# Patient Record
Sex: MALE | Race: OTHER | NOT HISPANIC OR LATINO | Employment: FULL TIME | URBAN - METROPOLITAN AREA
[De-identification: names, ages, dates, MRNs, and addresses within clinical notes are randomized per-mention and may not be internally consistent; named-entity substitution may affect disease eponyms.]

---

## 2019-08-21 ENCOUNTER — OFFICE VISIT (OUTPATIENT)
Dept: FAMILY MEDICINE CLINIC | Facility: CLINIC | Age: 24
End: 2019-08-21
Payer: COMMERCIAL

## 2019-08-21 VITALS
HEIGHT: 67 IN | DIASTOLIC BLOOD PRESSURE: 70 MMHG | RESPIRATION RATE: 16 BRPM | WEIGHT: 162 LBS | TEMPERATURE: 98.2 F | BODY MASS INDEX: 25.43 KG/M2 | OXYGEN SATURATION: 98 % | HEART RATE: 99 BPM | SYSTOLIC BLOOD PRESSURE: 130 MMHG

## 2019-08-21 DIAGNOSIS — F41.9 ANXIETY: Primary | ICD-10-CM

## 2019-08-21 PROCEDURE — 99203 OFFICE O/P NEW LOW 30 MIN: CPT | Performed by: FAMILY MEDICINE

## 2019-08-21 RX ORDER — ALBUTEROL SULFATE 90 UG/1
2 AEROSOL, METERED RESPIRATORY (INHALATION) EVERY 6 HOURS PRN
COMMUNITY

## 2019-08-21 RX ORDER — FLUOXETINE 10 MG/1
10 CAPSULE ORAL DAILY
Qty: 30 CAPSULE | Refills: 5 | Status: SHIPPED | OUTPATIENT
Start: 2019-08-21

## 2019-08-21 NOTE — PROGRESS NOTES
Assessment/Plan:  1  Anxiety  Excessive anxiety and worry regarding several issues are present most of the time for more than 6 months  Difficulty controlling worry  For the past 6 months he  Restlessness or feeling on edge  Easily fatigued  Difficulty concentrating  Irritability  Muscle tension  Sleep disturbance (difficulty falling or staying asleep, or restless sleep)  Anxiety causes significant distress or impairment in social, occupational, or other important areas of functioning  There is not another mental disorder (e g , worries about having a panic attack in panic disorder), worries about embarrassment in public (social phobia), fear of contamination (obsessive-compulsive disorder), separation anxiety; fear of gaining weight (anorexia nervosa), multiple physical complaints (somatization disorder), fear of a serious illness (hypochondriasis), or exclusively due to PTSD  The present episode of anxiety  is not attributable to the physiological effects of a substance or another medical condition  Marijuana [place] approved by [place] dept of health  Patient wants to try Medical Marijuana   - FLUoxetine (PROzac) 10 mg capsule; Take 1 capsule (10 mg total) by mouth daily  Dispense: 30 capsule; Refill: 5  - Comprehensive metabolic panel; Future  - CBC and differential; Future  - TSH, 3rd generation with Free T4 reflex; Future    2  BMI 25 0-25 9,adult  Condition is stable with current treatment, to  continue the same      No problem-specific Assessment & Plan notes found for this encounter  Diagnoses and all orders for this visit:    Anxiety  -     FLUoxetine (PROzac) 10 mg capsule; Take 1 capsule (10 mg total) by mouth daily    Other orders  -     albuterol (PROVENTIL HFA,VENTOLIN HFA) 90 mcg/act inhaler; Inhale 2 puffs every 6 (six) hours as needed for wheezing          Subjective:      Patient ID: Remi Bamberger is a 30 y o  male      HPI  History of 6 months of :  Difficulty controlling worry   Restlessness or feeling on edge  Easily fatigued  Difficulty concentrating  Irritability  Muscle tension  Sleep disturbance (difficulty falling or staying asleep, or restless sleep)  The following portions of the patient's history were reviewed and updated as appropriate: allergies, current medications, past family history, past medical history, past social history, past surgical history and problem list     Review of Systems   Constitutional: Positive for fatigue  Negative for chills, diaphoresis and fever  HENT: Negative for hearing loss, sinus pressure, sore throat and trouble swallowing  Eyes: Negative for photophobia, pain, redness and visual disturbance  Respiratory: Negative for cough, choking, chest tightness and shortness of breath  Cardiovascular: Negative for chest pain, palpitations and leg swelling  Gastrointestinal: Negative for abdominal pain  Genitourinary: Negative for difficulty urinating, dysuria, enuresis and flank pain  Musculoskeletal: Negative for arthralgias, back pain, gait problem and joint swelling  Neurological: Negative for dizziness, facial asymmetry, light-headedness and headaches  Psychiatric/Behavioral: Negative for agitation, behavioral problems, confusion and decreased concentration  Objective:      /70 (BP Location: Left arm, Patient Position: Sitting, Cuff Size: Standard)   Pulse 99   Temp 98 2 °F (36 8 °C) (Oral)   Resp 16   Ht 5' 7" (1 702 m)   Wt 73 5 kg (162 lb)   SpO2 98%   BMI 25 37 kg/m²          Physical Exam   Constitutional: No distress  HENT:   Nose: Nose normal    Mouth/Throat: Oropharynx is clear and moist    Eyes: Pupils are equal, round, and reactive to light  Conjunctivae are normal    Neck: Normal range of motion  No thyromegaly present  Cardiovascular: Normal rate, regular rhythm and normal heart sounds  Exam reveals no friction rub  No murmur heard    Pulmonary/Chest: Effort normal and breath sounds normal  No stridor  No respiratory distress  Musculoskeletal: He exhibits no edema, tenderness or deformity  Neurological: He displays normal reflexes  No cranial nerve deficit  He exhibits normal muscle tone  Coordination normal    Skin: He is not diaphoretic  BMI Counseling: Body mass index is 25 37 kg/m²  Discussed the patient's BMI with him  The BMI is above average  BMI counseling and education was provided to the patient  Exercise recommendations include exercising 3-5 times per week

## 2019-08-22 NOTE — PATIENT INSTRUCTIONS

## 2021-05-11 ENCOUNTER — TELEPHONE (OUTPATIENT)
Dept: FAMILY MEDICINE CLINIC | Facility: CLINIC | Age: 26
End: 2021-05-11

## 2024-02-07 ENCOUNTER — OFFICE VISIT (OUTPATIENT)
Dept: OBGYN CLINIC | Facility: OTHER | Age: 29
End: 2024-02-07
Payer: COMMERCIAL

## 2024-02-07 ENCOUNTER — APPOINTMENT (OUTPATIENT)
Dept: RADIOLOGY | Facility: OTHER | Age: 29
End: 2024-02-07
Payer: COMMERCIAL

## 2024-02-07 VITALS
HEART RATE: 74 BPM | HEIGHT: 67 IN | BODY MASS INDEX: 26.65 KG/M2 | DIASTOLIC BLOOD PRESSURE: 87 MMHG | SYSTOLIC BLOOD PRESSURE: 137 MMHG | WEIGHT: 169.8 LBS

## 2024-02-07 DIAGNOSIS — M25.562 LEFT KNEE PAIN, UNSPECIFIED CHRONICITY: ICD-10-CM

## 2024-02-07 DIAGNOSIS — M25.561 RIGHT KNEE PAIN, UNSPECIFIED CHRONICITY: ICD-10-CM

## 2024-02-07 DIAGNOSIS — M25.561 RIGHT KNEE PAIN, UNSPECIFIED CHRONICITY: Primary | ICD-10-CM

## 2024-02-07 PROCEDURE — 99203 OFFICE O/P NEW LOW 30 MIN: CPT | Performed by: ORTHOPAEDIC SURGERY

## 2024-02-07 PROCEDURE — 73564 X-RAY EXAM KNEE 4 OR MORE: CPT

## 2024-02-07 NOTE — PROGRESS NOTES
[facility]  Johnathan Uribe (29 y.o. male)   : 1995   MRN: [id number]  Encounter Date: 2024    Chief Complaint   Patient presents with    Right Knee - Pain       Assessment / Plan  Right Knee - pre-patellar bursitis      Discussed using knee pads or a kneeling pad to help reduce friction on patella  Take Ibuprofen 3 tables 3x a day or Naproxen 2 tablets 3x a day for 2-3 weeks  Modify job activities to reduce friction on patella  XR reviewed at today's visit  No follow-ups on file.    History of Present Illness  Johnathan Uribe is a 29 y.o. male who presents presents for right knee pain. He reports having the pain for a few weeks. He does not remember any inciting injury. He notes the pain is mostly over the anterior aspect of his knee. He states he can do daily activity without any issues, but gets the pain whenever he bumps the knee against anything. 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 He has not had any formal treatment for his knee. He denies any numbness and tingling.    Review of Systems  Pertinent items are noted in HPI.  All other systems were reviewed and are negative.    Physical Exam  /87   Pulse 74   Ht 5' 7\" (1.702 m)   Wt 77 kg (169 lb 12.8 oz)   BMI 26.59 kg/m²   Cons: Appears well.  No apparent distress.  Psych: Alert. Oriented x3.  Mood and affect normal.  Eyes: PERRLA, EOMI  Resp: Normal effort.  No audible wheezing or stridor.  CV: Palpable pulse.  No discernable arrhythmia.  No LE edema.  Lymph:  No palpable cervical, axillary, or inguinal lymphadenopathy.  Skin: Warm.  No palpable masses.  No visible lesions.  Neuro: Normal muscle tone.  Normal and symmetric DTR's.     Right Knee Exam  Alignment:  Normal knee alignment.  Inspection:  No swelling. No edema. No ecchymosis. No muscle atrophy.  Palpation:   superior border of the patella tenderness.  ROM:  Knee Extension 0. Knee Flexion 135.  Strength:  5/5 " quadriceps and hamstrings.  Stability:  No objective knee instability. Stable Varus / Valgus stress, Lachman, and Posterior drawer.  Tests:  No pertinent positive or negative tests.  Patella:  Patella tracks centrally without crepitus.  Neurovascular:  Sensation intact in DP/SP/Cote/Sa/T nerve distributions.  2+ DP & PT pulses.  Gait:  Normal.     Studies Reviewed  I have personally reviewed pertinent films in PACS.  XR of right knee - no osseous abnormalities    Procedures  No procedures today.    Medical, Surgical, Family, and Social History  The patient's medical history, family history, and social history, were reviewed and updated as appropriate.    Past Medical History:   Diagnosis Date    Anxiety     Asthma        Past Surgical History:   Procedure Laterality Date    CIRCUMCISION         Family History   Problem Relation Age of Onset    Hypertension Mother     Diabetes Father        Social History     Occupational History    Not on file   Tobacco Use    Smoking status: Never    Smokeless tobacco: Never   Substance and Sexual Activity    Alcohol use: Yes    Drug use: Yes     Types: Marijuana    Sexual activity: Yes     Partners: Female       No Known Allergies      Current Outpatient Medications:     albuterol (PROVENTIL HFA,VENTOLIN HFA) 90 mcg/act inhaler, Inhale 2 puffs every 6 (six) hours as needed for wheezing, Disp: , Rfl:     FLUoxetine (PROzac) 10 mg capsule, Take 1 capsule (10 mg total) by mouth daily (Patient not taking: Reported on 2/7/2024), Disp: 30 capsule, Rfl: 5      Reg Randall    Scribe Attestation      I,:  Reg Randall am acting as a scribe while in the presence of the attending physician.:       I,:  Daniel Pleitez MD personally performed the services described in this documentation    as scribed in my presence.:

## 2024-02-21 ENCOUNTER — TELEPHONE (OUTPATIENT)
Dept: FAMILY MEDICINE CLINIC | Facility: CLINIC | Age: 29
End: 2024-02-21

## 2024-02-21 ENCOUNTER — TELEPHONE (OUTPATIENT)
Age: 29
End: 2024-02-21

## 2024-02-21 NOTE — TELEPHONE ENCOUNTER
1st Attempt  Patient called left message for patient to call the office to schedule a PE appt. Letter sent.

## 2024-02-21 NOTE — TELEPHONE ENCOUNTER
Caller: Tia -spouse     Doctor: Angela     Reason for call: Spouse stating patient has possible hand fx on the right hand she is also stating she was told by Dr. Miller patient can be put onto his schedule for this Friday no appointments are available until 3/8 are we able to set an appointment for the patient ?   Please advise      Call back#: [phone]

## 2024-02-23 VITALS
DIASTOLIC BLOOD PRESSURE: 63 MMHG | SYSTOLIC BLOOD PRESSURE: 100 MMHG | BODY MASS INDEX: 26.53 KG/M2 | WEIGHT: 169 LBS | HEIGHT: 67 IN

## 2024-02-23 DIAGNOSIS — S60.00XA CONTUSION OF FINGER OF RIGHT HAND, INITIAL ENCOUNTER: Primary | ICD-10-CM

## 2024-02-23 PROCEDURE — 99213 OFFICE O/P EST LOW 20 MIN: CPT | Performed by: ORTHOPAEDIC SURGERY

## 2024-02-23 NOTE — PROGRESS NOTES
The HAND & UPPER EXTREMITY OFFICE VISIT   Referred By:  Jhony Valentine Md  [address] [place] [address]  [address] [place] [id number]      Chief Complaint:     Right hand pain    History of Present Illness:   29 y.o., Right hand dominant male presents with right small finger MCP pain    Patient states that he works [employer] and he was trying to loosen a pipe about 3 weeks ago when the pipe suddenly loosened and he hit his hand. He states he had immediate pain in the MCP of his small finger. He states since the injury, his pain has improved slightly but he continue to have pain with making a fist as well as the joint being tender. He denies any numbness or tingling into his finger.   He has not used pain medications      ADLs: Community ambulator    Smoke: no   ETOH: no   Drugs:  Zillow  Job: [employer]       Past Medical History:  Past Medical History:   Diagnosis Date    Anxiety     Asthma      Past Surgical History:   Procedure Laterality Date    CIRCUMCISION       Family History   Problem Relation Age of Onset    Hypertension Mother     Diabetes Father      Social History     Socioeconomic History    Marital status: Single     Spouse name: Not on file    Number of children: Not on file    Years of education: Not on file    Highest education level: Not on file   Occupational History    Not on file   Tobacco Use    Smoking status: Never    Smokeless tobacco: Never   Substance and Sexual Activity    Alcohol use: Yes    Drug use: Yes     Types: Marijuana    Sexual activity: Yes     Partners: Female   Other Topics Concern    Not on file   Social History Narrative    Not on file     Social Determinants of Health     Financial Resource Strain: Not on file   Food Insecurity: Not on file   Transportation Needs: Not on file   Physical Activity: Not on file   Stress: Not on file   Social Connections: Not on file   Intimate Partner Violence: Not on file   Housing Stability: Not on file     Scheduled Meds:  Continuous Infusions:No current  "facility-administered medications for this visit.    PRN Meds:.  Allergies   Allergen Reactions    Cats Claw (Uncaria Tomentosa) Cough       Physical Examination:    /63   Ht 5' 7\" (1.702 m)   Wt 76.7 kg (169 lb)   BMI 26.47 kg/m²     Gen: A&Ox3, NAD  Cardiac: regular rate  Chest: non labored breathing  Abdomen: Non-distended        Right Upper Extremity:  Skin CDI  No obvious deformity of the shoulder, arm, elbow, forearm, wrist, hand  Sensation intact to light touch in the axillary median, ulnar, and radial nerve distributions  motor intact  2+RP    Tender at MCP  Mild painful fist formation  No ecchymosis    Slight flexion contracture of small finger DIP joint noted. Patient states DIP joint has been like that for years      Studies:  Radiographs: I personally reviewed and independently interpreted the available radiographs.  2/23/2024: Radiographs of the Right ahnd, multiple views, demonstrate no fractures or dislocations.  Soft tissues unremarkable.       Assessment and Plan:  1. Contusion of finger of right hand, initial encounter  XR hand 3+ vw right          29 y.o. male presents with signs and symptoms consistent with the above diagnosis.  We discussed the natural history of this condition and its pathogenesis.  We discussed operative and nonoperative treatment options.    Discussed XR are normal. At this time it appears to be a contusion of his 5th MCP joint.  He may be as active as he can tolerated and has no restrictions from my standpoint  This should improve over time  He can try a padded work glove for protection until he is feeling better  He states he does not like to take any pain medication so he is free to use ice  He is free to return if his symptoms do not improve or worsen. We can obtain an MRI if his pain persits    he expressed understanding of the plan and agreed. We encouraged them to contact our office with any questions or concerns.         Fabien Miller MD  Hand and Upper " Extremity Surgery        *This note was dictated using Dragon voice recognition software. Please excuse any word substitutions or errors.*

## 2024-03-05 ENCOUNTER — HOSPITAL ENCOUNTER (INPATIENT)
Facility: HOSPITAL | Age: 29
LOS: 1 days | Discharge: HOME | DRG: 203 | End: 2024-03-07
Attending: EMERGENCY MEDICINE | Admitting: INTERNAL MEDICINE
Payer: COMMERCIAL

## 2024-03-05 ENCOUNTER — NURSE TRIAGE (OUTPATIENT)
Age: 29
End: 2024-03-05

## 2024-03-05 ENCOUNTER — APPOINTMENT (EMERGENCY)
Dept: RADIOLOGY | Facility: HOSPITAL | Age: 29
DRG: 203 | End: 2024-03-05
Payer: COMMERCIAL

## 2024-03-05 DIAGNOSIS — J45.901 ACUTE ASTHMA EXACERBATION: Primary | ICD-10-CM

## 2024-03-05 DIAGNOSIS — J45.909 UNCOMPLICATED ASTHMA, UNSPECIFIED ASTHMA SEVERITY, UNSPECIFIED WHETHER PERSISTENT: Primary | ICD-10-CM

## 2024-03-05 LAB
ANION GAP SERPL CALCULATED.3IONS-SCNC: 9 MMOL/L
BASE EX.OXY STD BLDV CALC-SCNC: 73.6 % (ref 60–80)
BASE EXCESS BLDV CALC-SCNC: -3.9 MMOL/L
BASOPHILS # BLD AUTO: 0.09 THOUSANDS/ÂΜL (ref 0–0.1)
BASOPHILS NFR BLD AUTO: 1 % (ref 0–1)
BUN SERPL-MCNC: 6 MG/DL (ref 5–25)
CALCIUM SERPL-MCNC: 9.6 MG/DL (ref 8.4–10.2)
CHLORIDE SERPL-SCNC: 102 MMOL/L (ref 96–108)
CO2 SERPL-SCNC: 28 MMOL/L (ref 21–32)
CREAT SERPL-MCNC: 0.85 MG/DL (ref 0.6–1.3)
EOSINOPHIL # BLD AUTO: 0.55 THOUSAND/ÂΜL (ref 0–0.61)
EOSINOPHIL NFR BLD AUTO: 4 % (ref 0–6)
ERYTHROCYTE [DISTWIDTH] IN BLOOD BY AUTOMATED COUNT: 13.7 % (ref 11.6–15.1)
GFR SERPL CREATININE-BSD FRML MDRD: 116 ML/MIN/1.73SQ M
GLUCOSE SERPL-MCNC: 77 MG/DL (ref 65–140)
HCO3 BLDV-SCNC: 25.1 MMOL/L (ref 24–30)
HCT VFR BLD AUTO: 45.3 % (ref 36.5–49.3)
HGB BLD-MCNC: 14.9 G/DL (ref 12–17)
IMM GRANULOCYTES # BLD AUTO: 0.04 THOUSAND/UL (ref 0–0.2)
IMM GRANULOCYTES NFR BLD AUTO: 0 % (ref 0–2)
LYMPHOCYTES # BLD AUTO: 1.29 THOUSANDS/ÂΜL (ref 0.6–4.47)
LYMPHOCYTES NFR BLD AUTO: 10 % (ref 14–44)
MCH RBC QN AUTO: 25.8 PG (ref 26.8–34.3)
MCHC RBC AUTO-ENTMCNC: 32.9 G/DL (ref 31.4–37.4)
MCV RBC AUTO: 79 FL (ref 82–98)
MONOCYTES # BLD AUTO: 0.71 THOUSAND/ÂΜL (ref 0.17–1.22)
MONOCYTES NFR BLD AUTO: 6 % (ref 4–12)
NEUTROPHILS # BLD AUTO: 10.04 THOUSANDS/ÂΜL (ref 1.85–7.62)
NEUTS SEG NFR BLD AUTO: 79 % (ref 43–75)
NRBC BLD AUTO-RTO: 0 /100 WBCS
O2 CT BLDV-SCNC: 15.1 ML/DL
PCO2 BLDV: 63.4 MM HG (ref 42–50)
PH BLDV: 7.22 [PH] (ref 7.3–7.4)
PLATELET # BLD AUTO: 266 THOUSANDS/UL (ref 149–390)
PMV BLD AUTO: 12.2 FL (ref 8.9–12.7)
PO2 BLDV: 44.7 MM HG (ref 35–45)
POTASSIUM SERPL-SCNC: 3.8 MMOL/L (ref 3.5–5.3)
RBC # BLD AUTO: 5.77 MILLION/UL (ref 3.88–5.62)
SODIUM SERPL-SCNC: 139 MMOL/L (ref 135–147)
WBC # BLD AUTO: 12.72 THOUSAND/UL (ref 4.31–10.16)

## 2024-03-05 PROCEDURE — 96365 THER/PROPH/DIAG IV INF INIT: CPT

## 2024-03-05 PROCEDURE — 96375 TX/PRO/DX INJ NEW DRUG ADDON: CPT

## 2024-03-05 PROCEDURE — 71045 X-RAY EXAM CHEST 1 VIEW: CPT

## 2024-03-05 PROCEDURE — 99285 EMERGENCY DEPT VISIT HI MDM: CPT

## 2024-03-05 PROCEDURE — 99291 CRITICAL CARE FIRST HOUR: CPT | Performed by: EMERGENCY MEDICINE

## 2024-03-05 PROCEDURE — 80048 BASIC METABOLIC PNL TOTAL CA: CPT

## 2024-03-05 PROCEDURE — 96361 HYDRATE IV INFUSION ADD-ON: CPT

## 2024-03-05 PROCEDURE — 36415 COLL VENOUS BLD VENIPUNCTURE: CPT

## 2024-03-05 PROCEDURE — 93005 ELECTROCARDIOGRAM TRACING: CPT

## 2024-03-05 PROCEDURE — 85025 COMPLETE CBC W/AUTO DIFF WBC: CPT

## 2024-03-05 PROCEDURE — 0241U HB NFCT DS VIR RESP RNA 4 TRGT: CPT | Performed by: STUDENT IN AN ORGANIZED HEALTH CARE EDUCATION/TRAINING PROGRAM

## 2024-03-05 PROCEDURE — 96372 THER/PROPH/DIAG INJ SC/IM: CPT

## 2024-03-05 PROCEDURE — 82805 BLOOD GASES W/O2 SATURATION: CPT

## 2024-03-05 PROCEDURE — 94644 CONT INHLJ TX 1ST HOUR: CPT

## 2024-03-05 RX ORDER — DIAZEPAM 5 MG/ML
2.5 INJECTION, SOLUTION INTRAMUSCULAR; INTRAVENOUS ONCE
Status: COMPLETED | OUTPATIENT
Start: 2024-03-05 | End: 2024-03-05

## 2024-03-05 RX ORDER — PREDNISONE 20 MG/1
60 TABLET ORAL DAILY
Status: DISCONTINUED | OUTPATIENT
Start: 2024-03-06 | End: 2024-03-05

## 2024-03-05 RX ORDER — SODIUM CHLORIDE FOR INHALATION 0.9 %
12 VIAL, NEBULIZER (ML) INHALATION ONCE
Status: COMPLETED | OUTPATIENT
Start: 2024-03-05 | End: 2024-03-05

## 2024-03-05 RX ORDER — BUDESONIDE AND FORMOTEROL FUMARATE DIHYDRATE 80; 4.5 UG/1; UG/1
2 AEROSOL RESPIRATORY (INHALATION)
Status: DISCONTINUED | OUTPATIENT
Start: 2024-03-05 | End: 2024-03-07 | Stop reason: HOSPADM

## 2024-03-05 RX ORDER — PREDNISONE 10 MG/1
TABLET ORAL
Qty: 10 TABLET | Refills: 0 | Status: SHIPPED | OUTPATIENT
Start: 2024-03-05 | End: 2024-03-07

## 2024-03-05 RX ORDER — AZITHROMYCIN 250 MG/1
TABLET, FILM COATED ORAL
Qty: 6 TABLET | Refills: 0 | Status: SHIPPED | OUTPATIENT
Start: 2024-03-05 | End: 2024-03-07

## 2024-03-05 RX ORDER — MAGNESIUM SULFATE HEPTAHYDRATE 40 MG/ML
2 INJECTION, SOLUTION INTRAVENOUS ONCE
Status: COMPLETED | OUTPATIENT
Start: 2024-03-05 | End: 2024-03-05

## 2024-03-05 RX ORDER — ALBUTEROL SULFATE 2.5 MG/3ML
5 SOLUTION RESPIRATORY (INHALATION) ONCE
Status: COMPLETED | OUTPATIENT
Start: 2024-03-05 | End: 2024-03-05

## 2024-03-05 RX ORDER — ALBUTEROL SULFATE 2.5 MG/3ML
2.5 SOLUTION RESPIRATORY (INHALATION) EVERY 4 HOURS PRN
Status: DISCONTINUED | OUTPATIENT
Start: 2024-03-05 | End: 2024-03-07 | Stop reason: HOSPADM

## 2024-03-05 RX ORDER — PREDNISONE 20 MG/1
60 TABLET ORAL DAILY
Status: DISCONTINUED | OUTPATIENT
Start: 2024-03-06 | End: 2024-03-07 | Stop reason: HOSPADM

## 2024-03-05 RX ORDER — TERBUTALINE SULFATE 1 MG/ML
0.25 INJECTION, SOLUTION SUBCUTANEOUS ONCE
Status: COMPLETED | OUTPATIENT
Start: 2024-03-05 | End: 2024-03-05

## 2024-03-05 RX ADMIN — ISODIUM CHLORIDE 12 ML: 0.03 SOLUTION RESPIRATORY (INHALATION) at 20:36

## 2024-03-05 RX ADMIN — TERBUTALINE SULFATE 0.25 MG: 1 INJECTION, SOLUTION SUBCUTANEOUS at 20:38

## 2024-03-05 RX ADMIN — ALBUTEROL SULFATE 10 MG: 2.5 SOLUTION RESPIRATORY (INHALATION) at 20:36

## 2024-03-05 RX ADMIN — ALBUTEROL SULFATE 10 MG: 2.5 SOLUTION RESPIRATORY (INHALATION) at 19:31

## 2024-03-05 RX ADMIN — IPRATROPIUM BROMIDE 1 MG: 0.5 SOLUTION RESPIRATORY (INHALATION) at 20:36

## 2024-03-05 RX ADMIN — ISODIUM CHLORIDE 12 ML: 0.03 SOLUTION RESPIRATORY (INHALATION) at 19:34

## 2024-03-05 RX ADMIN — MAGNESIUM SULFATE HEPTAHYDRATE 2 G: 40 INJECTION, SOLUTION INTRAVENOUS at 19:36

## 2024-03-05 RX ADMIN — IPRATROPIUM BROMIDE 1 MG: 0.5 SOLUTION RESPIRATORY (INHALATION) at 19:33

## 2024-03-05 RX ADMIN — IPRATROPIUM BROMIDE 0.5 MG: 0.5 SOLUTION RESPIRATORY (INHALATION) at 18:58

## 2024-03-05 RX ADMIN — DEXAMETHASONE 6 MG: 2 TABLET ORAL at 19:15

## 2024-03-05 RX ADMIN — SODIUM CHLORIDE 1000 ML: 0.9 INJECTION, SOLUTION INTRAVENOUS at 19:38

## 2024-03-05 RX ADMIN — DIAZEPAM 2.5 MG: 10 INJECTION, SOLUTION INTRAMUSCULAR; INTRAVENOUS at 20:34

## 2024-03-05 RX ADMIN — ALBUTEROL SULFATE 5 MG: 2.5 SOLUTION RESPIRATORY (INHALATION) at 18:58

## 2024-03-05 NOTE — TELEPHONE ENCOUNTER
Tia called in for Johnathan . Patient has asthma . Inhalers are not working , SOB / warm transfer to Pul CTS

## 2024-03-05 NOTE — TELEPHONE ENCOUNTER
"Patient call:  Pt stated provider: None    Actionable item: None- Appointment scheduled    What is the reason for the call/chief complaint?    Patient and spouse calling after progressively worsening asthma symptoms over the past 2 weeks.     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    Symptoms include wheezing, SOB, and clear-yellow mucous productive cough.       Dispo: Minimize pollutants/allergens by wearing mask during job. Continue with inhaler use. Appointment scheduled.    [facility]  Agrees with plan.   All questions answered. No further needs at the moment.     Reason for Disposition   MODERATE longstanding difficulty breathing (e.g., speaks in phrases, SOB even at rest, pulse 100-120) and SAME as normal    Answer Assessment - Initial Assessment Questions  1. RESPIRATORY STATUS: \"Describe your breathing?\" (e.g., wheezing, shortness of breath, unable to speak, severe coughing)       Wheezing, SOB  2. ONSET: \"When did this breathing problem begin?\"       2 weeks ago  3. PATTERN \"Does the difficult breathing come and go, or has it been constant since it started?\"       Constant  4. SEVERITY: \"How bad is your breathing?\" (e.g., mild, moderate, severe)     - MILD: No SOB at rest, mild SOB with walking, speaks normally in sentences, can lay down, no retractions, pulse < 100.     - MODERATE: SOB at rest, SOB with minimal exertion and prefers to sit, cannot lie down flat, speaks in phrases, mild retractions, audible wheezing, pulse 100-120.     - SEVERE: Very SOB at rest, speaks in single words, struggling to breathe, sitting hunched forward, retractions, pulse > 120       worsened  7. LUNG HISTORY: \"Do you have any history of lung disease?\"  (e.g., pulmonary embolus, asthma, emphysema)      Asthma and some asbestos exposure in past   8. CAUSE: \"What do you think is causing the breathing " "problem?\"       asthma  9. OTHER SYMPTOMS: \"Do you have any other symptoms? (e.g., dizziness, runny nose, cough, chest pain, fever)      Clearish yellow mucous with cough    Protocols used: Breathing Difficulty-ADULT-[place]    "

## 2024-03-06 LAB
BASE EX.OXY STD BLDV CALC-SCNC: 60.6 % (ref 60–80)
BASE EXCESS BLDV CALC-SCNC: 2.3 MMOL/L
BASOPHILS # BLD MANUAL: 0 THOUSAND/UL (ref 0–0.1)
BASOPHILS NFR MAR MANUAL: 0 % (ref 0–1)
EOSINOPHIL # BLD MANUAL: 0 THOUSAND/UL (ref 0–0.4)
EOSINOPHIL NFR BLD MANUAL: 0 % (ref 0–6)
ERYTHROCYTE [DISTWIDTH] IN BLOOD BY AUTOMATED COUNT: 13.5 % (ref 11.6–15.1)
FLUAV RNA RESP QL NAA+PROBE: NEGATIVE
FLUBV RNA RESP QL NAA+PROBE: NEGATIVE
HCO3 BLDV-SCNC: 27.8 MMOL/L (ref 24–30)
HCT VFR BLD AUTO: 43.8 % (ref 36.5–49.3)
HGB BLD-MCNC: 14.2 G/DL (ref 12–17)
LG PLATELETS BLD QL SMEAR: PRESENT
LYMPHOCYTES # BLD AUTO: 0.08 THOUSAND/UL (ref 0.6–4.47)
LYMPHOCYTES # BLD AUTO: 1 % (ref 14–44)
MCH RBC QN AUTO: 25.9 PG (ref 26.8–34.3)
MCHC RBC AUTO-ENTMCNC: 32.4 G/DL (ref 31.4–37.4)
MCV RBC AUTO: 80 FL (ref 82–98)
MONOCYTES # BLD AUTO: 0 THOUSAND/UL (ref 0–1.22)
MONOCYTES NFR BLD: 0 % (ref 4–12)
NEUTROPHILS # BLD MANUAL: 7.85 THOUSAND/UL (ref 1.85–7.62)
NEUTS BAND NFR BLD MANUAL: 1 % (ref 0–8)
NEUTS SEG NFR BLD AUTO: 98 % (ref 43–75)
O2 CT BLDV-SCNC: 14 ML/DL
PCO2 BLDV: 45.8 MM HG (ref 42–50)
PH BLDV: 7.4 [PH] (ref 7.3–7.4)
PLATELET # BLD AUTO: 240 THOUSANDS/UL (ref 149–390)
PLATELET BLD QL SMEAR: ADEQUATE
PMV BLD AUTO: 11.3 FL (ref 8.9–12.7)
PO2 BLDV: 29.8 MM HG (ref 35–45)
RBC # BLD AUTO: 5.49 MILLION/UL (ref 3.88–5.62)
RBC MORPH BLD: PRESENT
RSV RNA RESP QL NAA+PROBE: NEGATIVE
SARS-COV-2 RNA RESP QL NAA+PROBE: NEGATIVE
WBC # BLD AUTO: 7.93 THOUSAND/UL (ref 4.31–10.16)

## 2024-03-06 PROCEDURE — NC001 PR NO CHARGE: Performed by: INTERNAL MEDICINE

## 2024-03-06 PROCEDURE — 82805 BLOOD GASES W/O2 SATURATION: CPT

## 2024-03-06 PROCEDURE — 94664 DEMO&/EVAL PT USE INHALER: CPT

## 2024-03-06 PROCEDURE — 94150 VITAL CAPACITY TEST: CPT

## 2024-03-06 PROCEDURE — 94640 AIRWAY INHALATION TREATMENT: CPT

## 2024-03-06 PROCEDURE — 99223 1ST HOSP IP/OBS HIGH 75: CPT | Performed by: INTERNAL MEDICINE

## 2024-03-06 PROCEDURE — 85007 BL SMEAR W/DIFF WBC COUNT: CPT | Performed by: STUDENT IN AN ORGANIZED HEALTH CARE EDUCATION/TRAINING PROGRAM

## 2024-03-06 PROCEDURE — 94760 N-INVAS EAR/PLS OXIMETRY 1: CPT

## 2024-03-06 PROCEDURE — 85027 COMPLETE CBC AUTOMATED: CPT | Performed by: STUDENT IN AN ORGANIZED HEALTH CARE EDUCATION/TRAINING PROGRAM

## 2024-03-06 RX ORDER — HYDROXYZINE HYDROCHLORIDE 25 MG/1
25 TABLET, FILM COATED ORAL ONCE
Status: COMPLETED | OUTPATIENT
Start: 2024-03-06 | End: 2024-03-06

## 2024-03-06 RX ORDER — LEVALBUTEROL INHALATION SOLUTION 1.25 MG/3ML
1.25 SOLUTION RESPIRATORY (INHALATION)
Status: DISCONTINUED | OUTPATIENT
Start: 2024-03-06 | End: 2024-03-07 | Stop reason: HOSPADM

## 2024-03-06 RX ORDER — ACETAMINOPHEN 325 MG/1
650 TABLET ORAL EVERY 6 HOURS PRN
Status: DISCONTINUED | OUTPATIENT
Start: 2024-03-06 | End: 2024-03-07 | Stop reason: HOSPADM

## 2024-03-06 RX ORDER — PREDNISONE 20 MG/1
60 TABLET ORAL DAILY
Qty: 9 TABLET | Refills: 0 | Status: CANCELLED | OUTPATIENT
Start: 2024-03-07 | End: 2024-03-10

## 2024-03-06 RX ADMIN — HYDROXYZINE HYDROCHLORIDE 25 MG: 25 TABLET, FILM COATED ORAL at 04:12

## 2024-03-06 RX ADMIN — IPRATROPIUM BROMIDE 0.5 MG: 0.5 SOLUTION RESPIRATORY (INHALATION) at 13:35

## 2024-03-06 RX ADMIN — BUDESONIDE AND FORMOTEROL FUMARATE DIHYDRATE 2 PUFF: 80; 4.5 AEROSOL RESPIRATORY (INHALATION) at 22:22

## 2024-03-06 RX ADMIN — IPRATROPIUM BROMIDE 0.5 MG: 0.5 SOLUTION RESPIRATORY (INHALATION) at 20:38

## 2024-03-06 RX ADMIN — IPRATROPIUM BROMIDE 0.5 MG: 0.5 SOLUTION RESPIRATORY (INHALATION) at 09:19

## 2024-03-06 RX ADMIN — ACETAMINOPHEN 650 MG: 325 TABLET, FILM COATED ORAL at 13:14

## 2024-03-06 RX ADMIN — BUDESONIDE AND FORMOTEROL FUMARATE DIHYDRATE 2 PUFF: 80; 4.5 AEROSOL RESPIRATORY (INHALATION) at 08:24

## 2024-03-06 RX ADMIN — PREDNISONE 60 MG: 20 TABLET ORAL at 08:23

## 2024-03-06 RX ADMIN — LEVALBUTEROL HYDROCHLORIDE 1.25 MG: 1.25 SOLUTION RESPIRATORY (INHALATION) at 20:38

## 2024-03-06 RX ADMIN — LEVALBUTEROL HYDROCHLORIDE 1.25 MG: 1.25 SOLUTION RESPIRATORY (INHALATION) at 09:18

## 2024-03-06 RX ADMIN — LEVALBUTEROL HYDROCHLORIDE 1.25 MG: 1.25 SOLUTION RESPIRATORY (INHALATION) at 13:35

## 2024-03-06 RX ADMIN — ALBUTEROL SULFATE 2.5 MG: 2.5 SOLUTION RESPIRATORY (INHALATION) at 04:13

## 2024-03-06 NOTE — ED PROVIDER NOTES
History  Chief Complaint   Patient presents with    Shortness of Breath     3 days ago started with sob. H/o asthma. Nebulizer treatments and inhalers not helping.      29-year-old man with relevant PMH asthma and anxiety presents with dyspnea. Patient reports gradually worsening dyspnea over the last few weeks similar to his prior asthma exacerbations. He has not had chest pain, vomiting, fever, abdominal pain, dysuria, or diarrhea. He has been using his rescue inhaler without relief. He has been hospitalized in the past for asthma but not since a child.        Prior to Admission Medications   Prescriptions Last Dose Informant Patient Reported? Taking?   FLUoxetine (PROzac) 10 mg capsule   No No   Sig: Take 1 capsule (10 mg total) by mouth daily   Patient not taking: Reported on 2024   albuterol (PROVENTIL HFA,VENTOLIN HFA) 90 mcg/act inhaler  Self Yes No   Sig: Inhale 2 puffs every 6 (six) hours as needed for wheezing   azithromycin (ZITHROMAX) 250 mg tablet   No No   Sig: Take 2 tablets today then 1 tablet daily x 4 days   predniSONE 10 mg tablet   No No   Si tabs  x 5 days      Facility-Administered Medications: None       Past Medical History:   Diagnosis Date    Anxiety     Asthma        Past Surgical History:   Procedure Laterality Date    CIRCUMCISION         Family History   Problem Relation Age of Onset    Hypertension Mother     Diabetes Father      I have reviewed and agree with the history as documented.    E-Cigarette/Vaping     E-Cigarette/Vaping Substances     Social History     Tobacco Use    Smoking status: Never    Smokeless tobacco: Never   Substance Use Topics    Alcohol use: Yes    Drug use: Yes     Types: Marijuana        Review of Systems    Physical Exam  ED Triage Vitals [24 1846]   Temperature Pulse Respirations Blood Pressure SpO2   97.5 °F (36.4 °C) (!) 107 22 128/77 96 %      Temp Source Heart Rate Source Patient Position - Orthostatic VS BP Location FiO2 (%)   Temporal  Monitor Sitting Left arm --      Pain Score       --             Orthostatic Vital Signs  Vitals:    03/05/24 1846 03/05/24 1858   BP: 128/77    Pulse: (!) 107 (!) 114   Patient Position - Orthostatic VS: Sitting        Physical Exam  Vitals and nursing note reviewed.   Constitutional:       Appearance: Normal appearance.   HENT:      Head: Normocephalic and atraumatic.      Right Ear: External ear normal.      Left Ear: External ear normal.   Cardiovascular:      Rate and Rhythm: Normal rate.   Pulmonary:      Effort: Tachypnea, accessory muscle usage and respiratory distress present.      Breath sounds: Examination of the right-upper field reveals wheezing. Examination of the left-upper field reveals wheezing. Examination of the right-middle field reveals wheezing. Examination of the left-middle field reveals wheezing. Examination of the right-lower field reveals wheezing. Examination of the left-lower field reveals wheezing. Wheezing present. No rhonchi or rales.   Abdominal:      Palpations: Abdomen is soft.      Tenderness: There is no abdominal tenderness. There is no guarding or rebound.   Musculoskeletal:         General: Normal range of motion.      Cervical back: Normal range of motion and neck supple.   Skin:     General: Skin is warm and dry.   Neurological:      Mental Status: He is alert and oriented to person, place, and time. Mental status is at baseline.   Psychiatric:         Mood and Affect: Mood normal.         Behavior: Behavior normal.         ED Medications  Medications   albuterol inhalation solution 5 mg (5 mg Nebulization Given 3/5/24 1858)     And   ipratropium (ATROVENT) 0.02 % inhalation solution 0.5 mg (0.5 mg Nebulization Given 3/5/24 1858)   dexamethasone (DECADRON) tablet 6 mg (6 mg Oral Given 3/5/24 1915)   magnesium sulfate 2 g/50 mL IVPB (premix) 2 g (0 g Intravenous Stopped 3/5/24 2030)   albuterol inhalation solution 10 mg (10 mg Nebulization Given 3/5/24 1931)   ipratropium  (ATROVENT) 0.02 % inhalation solution 1 mg (1 mg Nebulization Given 3/5/24 1933)   sodium chloride 0.9 % inhalation solution 12 mL (12 mL Nebulization Given 3/5/24 1934)   sodium chloride 0.9 % bolus 1,000 mL (0 mL Intravenous Stopped 3/5/24 2127)   terbutaline (BRETHINE) injection 0.25 mg (0.25 mg Subcutaneous Given 3/5/24 2038)   diazepam (VALIUM) injection 2.5 mg (2.5 mg Intravenous Given 3/5/24 2034)   albuterol inhalation solution 10 mg (10 mg Nebulization Given 3/5/24 2036)   ipratropium (ATROVENT) 0.02 % inhalation solution 1 mg (1 mg Nebulization Given 3/5/24 2036)   sodium chloride 0.9 % inhalation solution 12 mL (12 mL Nebulization Given 3/5/24 2036)       Diagnostic Studies  Results Reviewed       Procedure Component Value Units Date/Time    Blood gas, venous [[id number]]  (Abnormal) Collected: 03/05/24 2131    Lab Status: Final result Specimen: Blood from Arm, Left Updated: 03/05/24 2142     pH, Ike 7.216     pCO2, Ike 63.4 mm Hg      pO2, Ike 44.7 mm Hg      HCO3, Ike 25.1 mmol/L      Base Excess, Ike -3.9 mmol/L      O2 Content, Ike 15.1 ml/dL      O2 HGB, VENOUS 73.6 %     CBC and differential [[id number]]  (Abnormal) Collected: 03/05/24 2131    Lab Status: Final result Specimen: Blood from Arm, Left Updated: 03/05/24 2142     WBC 12.72 Thousand/uL      RBC 5.77 Million/uL      Hemoglobin 14.9 g/dL      Hematocrit 45.3 %      MCV 79 fL      MCH 25.8 pg      MCHC 32.9 g/dL      RDW 13.7 %      MPV 12.2 fL      Platelets 266 Thousands/uL      nRBC 0 /100 WBCs      Neutrophils Relative 79 %      Immat GRANS % 0 %      Lymphocytes Relative 10 %      Monocytes Relative 6 %      Eosinophils Relative 4 %      Basophils Relative 1 %      Neutrophils Absolute 10.04 Thousands/µL      Immature Grans Absolute 0.04 Thousand/uL      Lymphocytes Absolute 1.29 Thousands/µL      Monocytes Absolute 0.71 Thousand/µL      Eosinophils Absolute 0.55 Thousand/µL      Basophils Absolute 0.09 Thousands/µL     Basic metabolic  "panel [[id number]] Collected: 03/05/24 2131    Lab Status: In process Specimen: Blood from Arm, Left Updated: 03/05/24 2136                   XR chest 1 view portable    (Results Pending)         Procedures  Procedures      ED Course  ED Course as of 03/05/24 2148   Tue Mar 05, 2024   1947 This ECG was interpreted by me. The ECG demonstrates Normal sinus rhythm, normal intervals, normal axis, normal QRS, non specific acute ST-T changes          SBIRT 20yo+      Flowsheet Row Most Recent Value   Initial Alcohol Screen: US AUDIT-C     1. How often do you have a drink containing alcohol? 2 Filed at: 03/05/2024 1859   2. How many drinks containing alcohol do you have on a typical day you are drinking?  0 Filed at: 03/05/2024 1859   3a. Male UNDER 65: How often do you have five or more drinks on one occasion? 0 Filed at: 03/05/2024 1859   Audit-C Score 2 Filed at: 03/05/2024 1859   LEXY: How many times in the past year have you...    Used an illegal drug or used a prescription medication for non-medical reasons? Weekly Filed at: 03/05/2024 1859   DAST-10: In the past 12 months...    1. Have you used drugs other than those required for medical reasons? 0 Filed at: 03/05/2024 1859   2. Do you use more than one drug at a time? 0 Filed at: 03/05/2024 1859   3. Have you had medical problems as a result of your drug use (e.g., memory loss, hepatitis, convulsions, bleeding, etc.)? 0 Filed at: 03/05/2024 1859   4. Have you had \"blackouts\" or \"flashbacks\" as a result of drug use?YesNo 0 Filed at: 03/05/2024 1859   5. Do you ever feel bad or guilty about your drug use? 0 Filed at: 03/05/2024 1859   6. Does your spouse (or parent) ever complain about your involvement with drugs? 0 Filed at: 03/05/2024 1859   7. Have you neglected your family because of your use of drugs? 0 Filed at: 03/05/2024 1859   8. Have you engaged in illegal activities in order to obtain drugs? 0 Filed at: 03/05/2024 1859   9. Have you ever experienced " "withdrawal symptoms (felt sick) when you stopped taking drugs? 0 Filed at: 03/05/2024 1859   10. Are you always able to stop using drugs when you want to? 0 Filed at: 03/05/2024 1859   DAST-10 Score 0 Filed at: 03/05/2024 1859                  Medical Decision Making  Patient arrives in asthma exacerbation. Initially treated with Bowling nebulizer, 6 mg dexamethasone, and 2 g IV Mg. His exacerbation worsened, and required another Bowling nebulizer and terbutaline. He was given diazepam to help with his anxiety with the goal to calm down his respirations. After second Bowling nebulizer, he was still diffusely wheezing but was calmer and moving better air. Will admit to medicine for asthma exacerbation. Patient in agreement with plan and questions were answered.     Previous charting was reviewed.  History obtained from patient.    Portions or all of this note were generated using voice recognition software.  Occasional wrong word or \"sound a like\" substitutions may have occurred due to the inherent limitations of voice recognition software.  Please interpret any errors within the intended context of the whole sentence or idea.      Amount and/or Complexity of Data Reviewed  Labs: ordered.  Radiology: ordered.    Risk  Prescription drug management.          Disposition  Final diagnoses:   Acute asthma exacerbation     Time reflects when diagnosis was documented in both MDM as applicable and the Disposition within this note       Time User Action Codes Description Comment    3/5/2024  9:39 PM Tristian Fox Add [J45.901] Acute asthma exacerbation           ED Disposition       ED Disposition   Admit    Condition   Stable    Date/Time   Tue Mar 5, 2024 5566    Comment   Case was discussed with SOD and the patient's admission status was agreed to be Admission Status: observation status to the service of   .               Follow-up Information    None         Patient's Medications   Discharge Prescriptions    No medications " on file     No discharge procedures on file.    PDMP Review       None             ED Provider  Attending physically available and evaluated Johnathan Uribe. I managed the patient along with the ED Attending.    Electronically Signed by           Tristian Fox MD  03/05/24 4279

## 2024-03-06 NOTE — DISCHARGE INSTR - AVS FIRST PAGE
Dear Johnathan Uribe,     It was our pleasure to care for you here at [facility].  It is our hope that we were always able to exceed the expected standards for your care during your stay.  You were hospitalized due to shortness of breath due to asthma exacerbation.  For follow up as well as any medication refills, we recommend that you follow up with your primary care physician. However, at this time we provide for you here, the most important instructions / recommendations at discharge:     Notable Medication Adjustments -   Please start taking Symbicort 2 puffs twice a day  Please take prednisone 60 mg for a total of 5 days of steroid treatment ending 3/9  Important follow up information -   Please follow-up with your primary care physician within 1 week of discharge  Please follow-up with pulmonology at your next scheduled appointment (today at 9:20 am)  Other Instructions -   If you have worsening shortness of breath that is not responding to your rescue inhaler please return to the ED for further evaluation and treatment  Please review this entire after visit summary as additional general instructions including medication list, appointments, activity, diet, any pertinent wound care, and other additional recommendations from your care team that may be provided for you.      Sincerely,     Ben Max MD

## 2024-03-06 NOTE — RESPIRATORY THERAPY NOTE
RT Protocol Note  Johnathan Uribe 29 y.o. male MRN: [id number]  Unit/Bed#: CW2 213-01 Encounter: [id number]    Assessment    Active Problems:    Asthma exacerbation      Home Pulmonary Medications:    Home Devices/Therapy: Other (Comment)    Past Medical History:   Diagnosis Date    Anxiety     Asthma      Social History     Socioeconomic History    Marital status: Single     Spouse name: None    Number of children: None    Years of education: None    Highest education level: None   Occupational History    None   Tobacco Use    Smoking status: Never    Smokeless tobacco: Never   Substance and Sexual Activity    Alcohol use: Yes    Drug use: Yes     Types: Marijuana    Sexual activity: Yes     Partners: Female   Other Topics Concern    None   Social History Narrative    None     Social Determinants of Health     Financial Resource Strain: Not on file   Food Insecurity: Not on file   Transportation Needs: Not on file   Physical Activity: Not on file   Stress: Not on file   Social Connections: Not on file   Intimate Partner Violence: Not on file   Housing Stability: Not on file       Subjective         Objective    Physical Exam:   Assessment Type: Assess only  General Appearance: Awake, Alert  Respiratory Pattern: Normal  Chest Assessment: Chest expansion symmetrical  Bilateral Breath Sounds: Expiratory wheezes    Vitals:  Blood pressure 123/87, pulse (!) 127, temperature 97.9 °F (36.6 °C), resp. rate 22, SpO2 93%.          Imaging and other studies:           Plan    Respiratory Plan: Home Bronchodilator Patient pathway        Resp Comments: (P) Pt admitted for pain, acute asthma exacerbation.  Pt assess per respiratory protocol at this time.  Per chart, Pt takes albuterol MDI Prn at home, albuterol udn already ordered. BS expiratory wheezes, will order xopenex and atrovent tid and continue to monitor.

## 2024-03-06 NOTE — RESPIRATORY THERAPY NOTE
Resp care   03/06/24 0919   Inhalation Therapy Tx   $ Inhalation Therapy Performed Yes   $ Pulse Oximetry Spot Check Charge Completed   SpO2 96 %   Pre-Treatment Pulse 105   Pre-Treatment Respirations 20   Duration 10   Breath Sounds Pre-Treatment Bilateral Expiratory wheeze   Breath Sounds Post-Treatment Bilateral Expiratory wheezes   Post-Treatment Pulse 100   Post-Treatment Respirations 20   Delivery Source Oxygen;UDN   Position Semi [facility]   Treatment Tolerance Tolerated well   Resp Comments pt found on ra, spo2 is 96%, bs are exp wheeze, udn tx given, will continue to monitor per resp protocol.

## 2024-03-06 NOTE — ASSESSMENT & PLAN NOTE
Admitted for asthma exacerbation, typically managed with as needed albuterol triggered by allergies, either cat dander or seasonal allergies.  Received Decadron, magnesium, nebulizers, terbutaline in the ED    Although patient reported improvement of symptoms he states he was tripoding and unable to complete full sentences on breath.  -WBC 21, however no signs of active infection-likely reactive in setting of steroid use    5-day course of steroids ending 3/9  Initiated on ICS-formoterol inhaler  Continue with SYLVIE prn  No antibiotics at this time  Pulm appt this morning at 9:20 with Dr. Nieto

## 2024-03-06 NOTE — H&P
INTERNAL MEDICINE RESIDENCY ADMISSION H&P     Name: Johnathan Uribe   Age & Sex: 29 y.o. male   MRN: [id number]  Unit/Bed#: 2 213-01   Encounter: [id number]  Primary Care Provider: Jhony Valentine MD    Code Status: Level 1 - Full Code  Admission Status: OBSERVATION  Disposition: Patient requires Med/Surg    Admit to team: SOD Team B     ASSESSMENT/PLAN     Active Problems:    Asthma exacerbation      Asthma exacerbation  Assessment & Plan  Admitted for asthma exacerbation, typically managed with as needed albuterol triggered by allergies, either cat dander or seasonal allergies.  Received Decadron, magnesium, nebulizers, terbutaline in the ED    5-day course of steroids  Initiated on ICS-formoterol inhaler  Continue with SYVLIE prn  Check COVID flu RSV for potential viral etiology  Scheduled for outpatient pulmonary appointment this Thursday with Dr. Nieto  No antibiotics at this time        VTE Pharmacologic Prophylaxis: Reason for no pharmacologic prophylaxis low risk  VTE Mechanical Prophylaxis: sequential compression device    CHIEF COMPLAINT     Chief Complaint   Patient presents with    Shortness of Breath     3 days ago started with sob. H/o asthma. Nebulizer treatments and inhalers not helping.       HISTORY OF PRESENT ILLNESS     29-year-old male with past medical history significant for asthma, seasonal allergies presenting to the ED for worsening shortness of breath that started on Saturday and has not been responding to inhalers.      While in the ED, patient received albuterol and ipratropium nebulizers, 6 mg Decadron, 2 mg magnesium sulfate and nebulized saline as well as terbutaline injection.  Patient received Valium for anxiety.  Wheezing improved however still persistent on exam the patient was admitted for observation.    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  Typically will be  able to be resolved with rescue inhaler.  States that he rarely requires rescue inhaler otherwise.  Denies any recent travels.  Does note mild sore throat and congestion on .  Denies any fevers, chills, nausea, cough. Denies any sick contacts within the last week.  Patient previously smoked tobacco however has not smoked in a few weeks.  Currently does not follow neurologist for his asthma, however does have [facility].    Chest x-ray done in the ED pending final read, no acute cardiopulmonary abnormalities on wet read. Lab work remarkable for VBG with pH 7.21, pCO2 63.4.  BMP unremarkable.  Mild leukocytosis at 12.7.  Patient tachycardic, otherwise vital stable, not requiring oxygen supplementation.  EKG showing sinus tachycardia.    REVIEW OF SYSTEMS     Review of Systems   All other systems reviewed and are negative.    OBJECTIVE     Vitals:    24 1858 24 2239 24 2309 24 2309   BP:  123/88 123/87 123/87   BP Location:       Pulse: (!) 114 (!) 126 (!) 130 (!) 127   Resp:       Temp:  98 °F (36.7 °C) 97.9 °F (36.6 °C) 97.9 °F (36.6 °C)   TempSrc:       SpO2: 97% 94% 93% 93%      Temperature:   Temp (24hrs), Av.8 °F (36.6 °C), Min:97.5 °F (36.4 °C), Max:98 °F (36.7 °C)    Temperature: 97.9 °F (36.6 °C)  Intake & Output:  I/O         03/ 0701  / 0700 03/ 0701  / 0700    IV Piggyback  1050    Total Intake  1050    Net  +1050                Weights:        There is no height or weight on file to calculate BMI.  Weight (last 2 days)       None          Physical Exam  Vitals reviewed.   Constitutional:       General: He is not in acute distress.     Appearance: Normal appearance. He is not ill-appearing.   HENT:      Head: Normocephalic and atraumatic.   Eyes:      Conjunctiva/sclera: Conjunctivae normal.   Cardiovascular:      Rate and Rhythm: Regular rhythm. Tachycardia present.      Pulses: Normal pulses.   Pulmonary:      Effort:  "No respiratory distress.      Breath sounds: Wheezing (diffuse) present.   Abdominal:      General: Abdomen is flat. There is no distension.      Tenderness: There is no abdominal tenderness. There is no guarding.   Neurological:      Mental Status: He is alert. Mental status is at baseline.   Psychiatric:         Mood and Affect: Mood normal.         Behavior: Behavior normal.       PAST MEDICAL HISTORY     Past Medical History:   Diagnosis Date    Anxiety     Asthma      PAST SURGICAL HISTORY     Past Surgical History:   Procedure Laterality Date    CIRCUMCISION       SOCIAL & FAMILY HISTORY     Social History     Substance and Sexual Activity   Alcohol Use Yes       Social History     Substance and Sexual Activity   Drug Use Yes    Types: Marijuana     Social History     Tobacco Use   Smoking Status Never   Smokeless Tobacco Never     Family History   Problem Relation Age of Onset    Hypertension Mother     Diabetes Father      LABORATORY DATA     Labs: I have personally reviewed pertinent reports.    Results from last 7 days   Lab Units 03/05/24  2131   WBC Thousand/uL 12.72*   HEMOGLOBIN g/dL 14.9   HEMATOCRIT % 45.3   PLATELETS Thousands/uL 266   NEUTROS PCT % 79*   MONOS PCT % 6   EOS PCT % 4      Results from last 7 days   Lab Units 03/05/24  2131   POTASSIUM mmol/L 3.8   CHLORIDE mmol/L 102   CO2 mmol/L 28   BUN mg/dL 6   CREATININE mg/dL 0.85   CALCIUM mg/dL 9.6                          Micro:  No results found for: \"BLOODCX\", \"URINECX\", \"WOUNDCULT\", \"SPUTUMCULTUR\"  IMAGING & DIAGNOSTIC TESTS     Imaging: I have personally reviewed pertinent reports.    No results found.  EKG, Pathology, and Other Studies: I have personally reviewed pertinent reports.     ALLERGIES     Allergies   Allergen Reactions    Cats Claw (Uncaria Tomentosa) Cough     MEDICATIONS PRIOR TO ARRIVAL     Prior to Admission medications    Medication Sig Start Date End Date Taking? Authorizing Provider   albuterol (PROVENTIL HFA,VENTOLIN " HFA) 90 mcg/act inhaler Inhale 2 puffs every 6 (six) hours as needed for wheezing   Yes Historical Provider, MD   azithromycin (ZITHROMAX) 250 mg tablet Take 2 tablets today then 1 tablet daily x 4 days  Patient not taking: Reported on 3/5/2024 3/5/24 3/9/24  CALLUM Salmeron   FLUoxetine (PROzac) 10 mg capsule Take 1 capsule (10 mg total) by mouth daily  Patient not taking: Reported on 2/7/2024 8/21/19   Jhony Valentine MD   predniSONE 10 mg tablet 2 tabs  x 5 days  Patient not taking: Reported on 3/5/2024 3/5/24   CALLUM Salmeron     MEDICATIONS ADMINISTERED IN LAST 24 HOURS     Medication Administration - last 24 hours from 03/05/2024 0115 to 03/06/2024 0115         Date/Time Order Dose Route Action Action by     03/05/2024 1858 EST albuterol inhalation solution 5 mg 5 mg Nebulization Given Say Mesce IV, RN     03/05/2024 1858 EST ipratropium (ATROVENT) 0.02 % inhalation solution 0.5 mg 0.5 mg Nebulization Given Say Mesce IV, RN     03/05/2024 1915 EST dexamethasone (DECADRON) tablet 6 mg 6 mg Oral Given Say Mesce IV, RN     03/05/2024 2030 EST magnesium sulfate 2 g/50 mL IVPB (premix) 2 g 0 g Intravenous Stopped Say Mesce IV, RN     03/05/2024 1936 EST magnesium sulfate 2 g/50 mL IVPB (premix) 2 g 2 g Intravenous New Bag Say Mesce IV, RN     03/05/2024 1931 EST albuterol inhalation solution 10 mg 10 mg Nebulization Given Say Mesce IV, RN     03/05/2024 1933 EST ipratropium (ATROVENT) 0.02 % inhalation solution 1 mg 1 mg Nebulization Given Say Mesce IV, RN     03/05/2024 1934 EST sodium chloride 0.9 % inhalation solution 12 mL 12 mL Nebulization Given Say Mesce IV, RN     03/05/2024 2127 EST sodium chloride 0.9 % bolus 1,000 mL 0 mL Intravenous Stopped Say Mesce IV, RN     03/05/2024 1938 EST sodium chloride 0.9 % bolus 1,000 mL 1,000 mL Intravenous New Bag Say Durham IV, RN     03/05/2024 2038 EST terbutaline (BRETHINE) injection 0.25 mg 0.25 mg Subcutaneous Given  "Say Mesce IV, RN     03/05/2024 2034 EST diazepam (VALIUM) injection 2.5 mg 2.5 mg Intravenous Given Say Mesce IV, RN     03/05/2024 2036 EST albuterol inhalation solution 10 mg 10 mg Nebulization Given Say Mesce IV, RN     03/05/2024 2036 EST ipratropium (ATROVENT) 0.02 % inhalation solution 1 mg 1 mg Nebulization Given Say Mesce IV, RN     03/05/2024 2036 EST sodium chloride 0.9 % inhalation solution 12 mL 12 mL Nebulization Given Say Mesce IV, RN     03/06/2024 0024 EST budesonide-formoterol (SYMBICORT) 80-4.5 MCG/ACT inhaler 2 puff 2 puff Inhalation Not Given Lubna Aguiar RN          CURRENT MEDICATIONS     Current Facility-Administered Medications   Medication Dose Route Frequency Provider Last Rate    albuterol  2.5 mg Nebulization Q4H PRN Jorge Agrawal DO      budesonide-formoterol  2 puff Inhalation BID Jorge Agrawal DO      predniSONE  60 mg Oral Daily Jorge Agrawal DO          albuterol, 2.5 mg, Q4H PRN        Admission Time  I spent 45 minutes admitting the patient.  This involved direct patient contact where I performed a full history and physical, reviewing previous records, and reviewing laboratory and other diagnostic studies.    Portions of the record may have been created with voice recognition software.  Occasional wrong word or \"sound a like\" substitutions may have occurred due to the inherent limitations of voice recognition software.  Read the chart carefully and recognize, using context, where substitutions have occurred.    ==  Jorge Agrawal DO  [facility]  Internal Medicine Residency PGY-3    "

## 2024-03-06 NOTE — PROGRESS NOTES
[facility]   PROGRESS NOTE- Johnathan Uribe, 1995, 29 y.o. male MRN: [id number]   Unit/Bed#: 2 213/2 213-01 Encounter: [id number]   Primary Care Physician: Jhony Valentine MD   Date and Time Admitted to Hospital: 3/5/2024  6:55 PM     Assessment/Plan:   Asthma exacerbation  Assessment & Plan  Admitted for asthma exacerbation, typically managed with as needed albuterol triggered by allergies, either cat dander or seasonal allergies.  Received Decadron, magnesium, nebulizers, terbutaline in the ED    Although patient reported improvement of symptoms he states he was tripoding and unable to complete full sentences on breath.    5-day course of steroids 2/5  Initiated on ICS-formoterol inhaler  Continue with SYLVIE prn  Obtain peak flow and repeat VBG  Scheduled for outpatient pulmonary appointment this Thursday with Dr. Nieto, possible early discharge tomorrow.  No antibiotics at this time         VTE Pharmacologic Prophylaxis:   Low Risk (Score 0-2) - Encourage Ambulation.    Patient Centered Rounds:  Performed bedside rounds with nursing staff.   Discussions with Specialists or Other Care Team Provider: Senior resident and attending  Education and Discussions with Family / Patient: Updated  (wife) at bedside.    Current Length of Stay: 0 day(s)  Current Patient Status: Inpatient   Discharge Plan: Anticipate discharge tomorrow to home.    Code Status: Level 1 - Full Code    Subjective:   No acute events overnight.  Patient was seen tripoding and having conversational dyspnea.  He does report improvement of his symptoms from yesterday.  He also stated that he had some nausea yesterday however now it has resolved.  Otherwise no new acute symptoms at this time.    Objective:     Vitals:   Temp (24hrs), Av.9 °F (36.6 °C), Min:97.5 °F (36.4 °C), Max:98 °F (36.7 °C)    Temp:  [97.5 °F (36.4 °C)-98 °F (36.7 °C)] 98 °F (36.7 °C)  HR:  [107-130] 119  Resp:   [22] 22  BP: (123-131)/(77-88) 131/85  SpO2:  [92 %-97 %] 92 %  There is no height or weight on file to calculate BMI.     Input and Output Summary (last 24 hours):     Intake/Output Summary (Last 24 hours) at 3/6/2024 0850  Last data filed at 3/5/2024 2127  Gross per 24 hour   Intake 1050 ml   Output --   Net 1050 ml       Physical Exam  Vitals reviewed.   Constitutional:       General: He is not in acute distress.     Appearance: Normal appearance. He is not ill-appearing.   HENT:      Head: Normocephalic and atraumatic.   Eyes:      Conjunctiva/sclera: Conjunctivae normal.   Cardiovascular:      Rate and Rhythm: Regular rhythm. Tachycardia present.      Pulses: Normal pulses.   Pulmonary:      Breath sounds: Wheezing (diffuse) present.      Comments: Tripoding, conversational dyspnea  Abdominal:      General: Abdomen is flat. Bowel sounds are normal. There is no distension.      Palpations: Abdomen is soft.      Tenderness: There is no abdominal tenderness. There is no guarding or rebound.   Skin:     General: Skin is warm and dry.      Capillary Refill: Capillary refill takes less than 2 seconds.   Neurological:      General: No focal deficit present.      Mental Status: He is alert.   Psychiatric:         Mood and Affect: Mood normal.         Behavior: Behavior normal.          Additional Data:     Labs:  Results from last 7 days   Lab Units 03/06/24  0454 03/05/24  2131   WBC Thousand/uL 7.93 12.72*   HEMOGLOBIN g/dL 14.2 14.9   HEMATOCRIT % 43.8 45.3   PLATELETS Thousands/uL 240 266   BANDS PCT % 1  --    NEUTROS PCT %  --  79*   LYMPHS PCT %  --  10*   LYMPHO PCT % 1*  --    MONOS PCT %  --  6   MONO PCT % 0*  --    EOS PCT % 0 4     Results from last 7 days   Lab Units 03/05/24  2131   SODIUM mmol/L 139   POTASSIUM mmol/L 3.8   CHLORIDE mmol/L 102   CO2 mmol/L 28   BUN mg/dL 6   CREATININE mg/dL 0.85   ANION GAP mmol/L 9   CALCIUM mg/dL 9.6   GLUCOSE RANDOM mg/dL 77                        Lines/Drains:  Invasive Devices       Peripheral Intravenous Line  Duration             Peripheral IV 03/05/24 Distal;Left;Upper;Ventral (anterior) Arm <1 day                        Imaging: Reviewed pertinent radiology reports from this admission.   Recent Cultures (last 7 days):         Last 24 Hours Medication List:   Current Facility-Administered Medications   Medication Dose Route Frequency Provider Last Rate    albuterol  2.5 mg Nebulization Q4H PRN Jorge Agrawal DO      budesonide-formoterol  2 puff Inhalation BID Jorge Agrawal DO      ipratropium  0.5 mg Nebulization TID Donovan Martin MD      levalbuterol  1.25 mg Nebulization TID Donovan Martin MD      predniSONE  60 mg Oral Daily Jorge Agrawal DO          Today, Patient Was Seen By: Matthew Suarez DO    **Please Note: This note may have been constructed using a voice recognition system.**

## 2024-03-06 NOTE — DISCHARGE SUMMARY
[facility]   DISCHARGE- Johnathan Uribe, 1995, 29 y.o. male MRN: [id number]   Unit/Bed#: [facility] 907/[facility] 907-01 Encounter: [id number]   Primary Care Physician: Jhony Valentine MD   Date and Time Admitted to Hospital: 3/5/2024  6:55 PM     Assessment/Plan    * Asthma exacerbation  Assessment & Plan  Admitted for asthma exacerbation, typically managed with as needed albuterol triggered by allergies, either cat dander or seasonal allergies.  Received Decadron, magnesium, nebulizers, terbutaline in the ED    Although patient reported improvement of symptoms he states he was tripoding and unable to complete full sentences on breath.  -WBC 21, however no signs of active infection-likely reactive in setting of steroid use    5-day course of steroids ending 3/9  Initiated on ICS-formoterol inhaler  Continue with SYLVIE prn  No antibiotics at this time  Pulm appt this morning at 9:20 with Dr. Nieto       Medical Problems       Resolved Problems  Date Reviewed: 2/23/2024   None             Discharging Resident: Ben Max MD  Discharging Attending: Donovan Martin MD  PCP: Jhony Valentine MD  Admission Date:   Admission Orders (From admission, onward)       Ordered        03/06/24 0835  Inpatient Admission  Once            03/05/24 2146  Place in Observation  Once                          Discharge Date: 03/07/24    Consultations During Hospital Stay:  None     Procedures Performed:   None    Significant Findings / Test Results:   None    Incidental Findings:   Chest x-ray shows subtle right lower lobe hazy pulmonary infiltrate   I reviewed the above mentioned incidental findings with the patient and/or family and they expressed understanding.    Test Results Pending at Discharge (will require follow up):  None     Outpatient Tests Requested:  None    Complications: None    Reason for Admission: Shortness of breath due to asthma exacerbation    Hospital Course:   Johnathan  "Cal Uribe is a 29 y.o. male who presented to the hospital on 3/5/2024 initially due to worsening shortness of breath that started on Saturday and has not been responding to his albuterol inhaler.       While in the ED, patient received albuterol and ipratropium nebulizers, 6 mg Decadron, 2 mg magnesium sulfate and nebulized saline as well as terbutaline injection.  Patient received Valium for anxiety.     He was then admitted and transitioned from Decadron to oral prednisone 60 mg for 4 more days for total of 5 days of steroid treatment ending 3/9.  He was also transitioned from nebulized saline to now Symbicort inhaler, continuing to get nebulizer treatments 3 times a day.  He follows with Dr. Nieto pulmonology, next appointment is 3/7/2024.  The patient did have some leukocytosis however his white blood cell count did normalize the following day.  Repeat VBG shows improved pH and CO2.  Peak flow is at 550.  Patient was tentatively to be discharged on 3/6 however patient was still not back to baseline but significantly improved.  Patient and team agreed that he should stay overnight and will be discharged first thing on 3/7 for his appointment with Dr. Caballero.  Morning patient states he does feel significantly better.  He is not getting winded when walking and additionally can speak in full sentences with no difficulty.  Patient does endorse a slight cough that does happen from time to time but overall he feels significantly improved.      Please see above list of diagnoses and related plan for additional information.     Condition at Discharge: Stable    Discharge Day Visit / Exam:   Vitals: Blood Pressure: 120/83 (03/07/24 0735)  Pulse: 98 (03/07/24 0735)  Temperature: 98.1 °F (36.7 °C) (03/07/24 0735)  Temp Source: Oral (03/06/24 1632)  Respirations: 15 (03/07/24 0735)  Height: 5' 9\" (175.3 cm) (03/06/24 1632)  Weight - Scale: 73.8 kg (162 lb 11.2 oz) (03/06/24 1632)  SpO2: 94 % (03/07/24 " 0735)    Physical Exam  Vitals reviewed.   Constitutional:       Appearance: Normal appearance.   HENT:      Head: Normocephalic and atraumatic.      Mouth/Throat:      Mouth: Mucous membranes are moist.      Pharynx: Oropharynx is clear.   Cardiovascular:      Rate and Rhythm: Normal rate and regular rhythm.   Pulmonary:      Effort: Pulmonary effort is normal.      Breath sounds: Wheezing present.   Abdominal:      General: Abdomen is flat. Bowel sounds are normal. There is no distension.      Tenderness: There is no abdominal tenderness.   Musculoskeletal:      Right lower leg: No edema.      Left lower leg: No edema.   Neurological:      Mental Status: He is alert and oriented to person, place, and time.   Psychiatric:         Mood and Affect: Mood normal.         Behavior: Behavior normal.            Discharge instructions/Information to patient and family:   See after visit summary for information provided to patient and family.      Provisions for Follow-Up Care:  See after visit summary for information related to follow-up care and any pertinent home health orders.       Disposition:   Home    Planned Readmission: No     Discharge Medications:  See after visit summary for reconciled discharge medications provided to patient and/or family.      **Please Note: This note may have been constructed using a voice recognition system**

## 2024-03-06 NOTE — ED ATTENDING ATTESTATION
Final Diagnoses:     1. Acute asthma exacerbation      ED Course as of 03/08/24 1441   Tue Mar 05, 2024   1934 Procedure Note: EKG  Date/Time: 03/05/24 7:34 PM   Interpreted by: CHUCHO MCNULTY   Indications / Diagnosis: dyspnea / asthma  ECG reviewed by me, the ED Provider: yes   The EKG demonstrates:  Rhythm: , Sinus tach   Intervals: normal intervals  Axis: normal axis  QRS/Blocks: normal QRS, LVH  ST Changes: Minimal strain pattern likely from tachycarida.        I, Chucho Mcnulty MD, saw and evaluated the patient. All available labs and X-rays were ordered by me or the resident / non-physician and have been reviewed by myself. I discussed the patient with the resident / non-physician and agree with the resident's / non-physician practitioner's findings and plan as documented in the resident's / non-physician practicitioner's note, except where noted.   At this point, I agree with the current assessment done in the ED.   I was present during key portions of all procedures performed unless otherwise stated.     HPI:  NURSING TRIAGE:    This is a 29 y.o. male presenting for evaluation of likely moderate/severe asthma exacerbation. It started 3 days ago, gradually getting worse. Using home meds without much relief.  No f/ch/n/v  +chest tightness like previous asthma  Denies dizziness/LH  Compliant with medications  PMH: asthma, previous hospitalizations, mabye previous intubation?  +smoking (marijuana)  No alcohol.    Chief Complaint   Patient presents with    Shortness of Breath     3 days ago started with sob. H/o asthma. Nebulizer treatments and inhalers not helping.       PHYSICAL: ASSESSMENT + PLAN:   Pertinent: appears well  Mild tachycardia -120  Wheezing throughout, inspiratory/expiratory  Poor air movement  Normal speech despite it.     General: VS reviewed  Appears in NAD  awake, alert.   Well-nourished, well-developed. Appears stated age.   Speaking normally in full sentences.   Head:  "Normocephalic, atraumatic  Eyes: EOM-I. No diplopia.   No hyphema.   No subconjunctival hemorrhages.  Symmetrical lids.   ENT: Atraumatic external nose and ears.    MMM  No malocclusion. No stridor. Normal phonation. No drooling. Normal swallowing.   Neck: No JVD.  Abd: soft nt nd no rebound/guarding  MSK:   FROM spontaneously  Skin: Dry, intact.   Neuro: Awake, alert, GCS15, CN II-XII grossly intact.   Motor grossly intact.  Psychiatric/Behavioral: interacting normally; appropriate mood/affect.    Exam: deferred    Vitals:    03/06/24 1632 03/06/24 2042 03/06/24 2236 03/07/24 0735   BP: 134/86   120/83   Pulse: (!) 113   98   Resp: 16   15   Temp: 98.3 °F (36.8 °C)   98.1 °F (36.7 °C)   TempSrc: Oral      SpO2: 94% 93% 95% 94%   Weight: 73.8 kg (162 lb 11.2 oz)      Height: 5' 9\" (1.753 m)       - We will do IVF for insensible losses  - Steroids   - We will do aggressive beta-agonist therapy, specifically 1 hour BAUTISTA neb   - Given how dyspneic the patient appears, will do IV magnesium 2g over 30 minutes as well.   - Will continue to monitor patient.   - Re-assess --> disposition based on improvement. If still wheezing, tachypneic, or tachycardic, or appears ill and unable to do baseline activities, will admit.   - Critical care time: 44 minutes  - Critical care time was exclusive of seperately bilable procedures and treating other patients as well as teaching time.   - Critical care was necessary to treat or prevent imminent or life-threatening deterioration of the following condition: Dyspnea, asthma  exacerbation necessitating 1 hour neb treatment  - Critical care time was spent personally by me on the following activities as well as the above as per the ED course and rest of chart: blood draw for specimens, obtaining history from patient / surrogate, developement of a treatment plan, discussions with consultants, evaluation of patient's response to the treatment, examination of the patient, ordering/performing " treatements and interventions, re-evaluation of the patient's condition, review of old charts, ordering/reviewing laboratory studies, ordering/reviewing of radiographic studies     There are no obvious limitations to social determinants of care.   Nursing note reviewed.   Vitals reviewed.   Orders placed by myself and/or advanced practitioner / resident.    Previous chart was reviewed  No language barrier.   History obtained from patient.    There are no limitations to the history obtained:     Past Medical: Past Surgical:    has a past medical history of Allergic rhinitis, Anxiety, Asthma, and GERD (gastroesophageal reflux disease).  has a past surgical history that includes Circumcision and Vasectomy.   Social: Cardiac (Echo/Cath)   Social History     Substance and Sexual Activity   Alcohol Use Yes     Social History     Tobacco Use   Smoking Status Never   Smokeless Tobacco Never     Social History     Substance and Sexual Activity   Drug Use Yes    Types: Marijuana    No results found for this or any previous visit.    No results found for this or any previous visit.    No results found for this or any previous visit.     Labs: Imaging:   Labs Reviewed   CBC AND DIFFERENTIAL - Abnormal       Result Value Ref Range Status    WBC 12.72 (*) 4.31 - 10.16 Thousand/uL Final    RBC 5.77 (*) 3.88 - 5.62 Million/uL Final    Hemoglobin 14.9  12.0 - 17.0 g/dL Final    Hematocrit 45.3  36.5 - 49.3 % Final    MCV 79 (*) 82 - 98 fL Final    MCH 25.8 (*) 26.8 - 34.3 pg Final    MCHC 32.9  31.4 - 37.4 g/dL Final    RDW 13.7  11.6 - 15.1 % Final    MPV 12.2  8.9 - 12.7 fL Final    Platelets 266  149 - 390 Thousands/uL Final    nRBC 0  /100 WBCs Final    Neutrophils Relative 79 (*) 43 - 75 % Final    Immat GRANS % 0  0 - 2 % Final    Lymphocytes Relative 10 (*) 14 - 44 % Final    Monocytes Relative 6  4 - 12 % Final    Eosinophils Relative 4  0 - 6 % Final    Basophils Relative 1  0 - 1 % Final    Neutrophils Absolute 10.04 (*)  1.85 - 7.62 Thousands/µL Final    Immature Grans Absolute 0.04  0.00 - 0.20 Thousand/uL Final    Lymphocytes Absolute 1.29  0.60 - 4.47 Thousands/µL Final    Monocytes Absolute 0.71  0.17 - 1.22 Thousand/µL Final    Eosinophils Absolute 0.55  0.00 - 0.61 Thousand/µL Final    Basophils Absolute 0.09  0.00 - 0.10 Thousands/µL Final   BLOOD GAS, VENOUS - Abnormal    pH, Ike 7.216 (*) 7.300 - 7.400 Final    pCO2, Ike 63.4 (*) 42.0 - 50.0 mm Hg Final    pO2, Ike 44.7  35.0 - 45.0 mm Hg Final    HCO3, Ike 25.1  24 - 30 mmol/L Final    Base Excess, Ike -3.9  mmol/L Final    O2 Content, Ike 15.1  ml/dL Final    O2 HGB, VENOUS 73.6  60.0 - 80.0 % Final   BASIC METABOLIC PANEL    Sodium 139  135 - 147 mmol/L Final    Potassium 3.8  3.5 - 5.3 mmol/L Final    Chloride 102  96 - 108 mmol/L Final    CO2 28  21 - 32 mmol/L Final    ANION GAP 9  mmol/L Final    BUN 6  5 - 25 mg/dL Final    Creatinine 0.85  0.60 - 1.30 mg/dL Final    Comment: Standardized to IDMS reference method    Glucose 77  65 - 140 mg/dL Final    Comment: If the patient is fasting, the ADA then defines impaired fasting glucose as > 100 mg/dL and diabetes as > or equal to 123 mg/dL.    Calcium 9.6  8.4 - 10.2 mg/dL Final    eGFR 116  ml/min/1.73sq m Final    Narrative:     National Kidney Disease Foundation guidelines for Chronic Kidney Disease (CKD):     Stage 1 with normal or high GFR (GFR > 90 mL/min/1.73 square meters)    Stage 2 Mild CKD (GFR = 60-89 mL/min/1.73 square meters)    Stage 3A Moderate CKD (GFR = 45-59 mL/min/1.73 square meters)    Stage 3B Moderate CKD (GFR = 30-44 mL/min/1.73 square meters)    Stage 4 Severe CKD (GFR = 15-29 mL/min/1.73 square meters)    Stage 5 End Stage CKD (GFR <15 mL/min/1.73 square meters)  Note: GFR calculation is accurate only with a steady state creatinine    XR chest 1 view portable   Final Result      Subtle right lower lobe hazy pulmonary infiltrate noted.      Workstation performed: [id number]             Medications: Code Status:   Medications   albuterol inhalation solution 5 mg (5 mg Nebulization Given 3/5/24 1858)     And   ipratropium (ATROVENT) 0.02 % inhalation solution 0.5 mg (0.5 mg Nebulization Given 3/5/24 1858)   dexamethasone (DECADRON) tablet 6 mg (6 mg Oral Given 3/5/24 1915)   magnesium sulfate 2 g/50 mL IVPB (premix) 2 g (0 g Intravenous Stopped 3/5/24 2030)   albuterol inhalation solution 10 mg (10 mg Nebulization Given 3/5/24 1931)   ipratropium (ATROVENT) 0.02 % inhalation solution 1 mg (1 mg Nebulization Given 3/5/24 1933)   sodium chloride 0.9 % inhalation solution 12 mL (12 mL Nebulization Given 3/5/24 1934)   sodium chloride 0.9 % bolus 1,000 mL (0 mL Intravenous Stopped 3/5/24 2127)   terbutaline (BRETHINE) injection 0.25 mg (0.25 mg Subcutaneous Given 3/5/24 2038)   diazepam (VALIUM) injection 2.5 mg (2.5 mg Intravenous Given 3/5/24 2034)   albuterol inhalation solution 10 mg (10 mg Nebulization Given 3/5/24 2036)   ipratropium (ATROVENT) 0.02 % inhalation solution 1 mg (1 mg Nebulization Given 3/5/24 2036)   sodium chloride 0.9 % inhalation solution 12 mL (12 mL Nebulization Given 3/5/24 2036)   hydrOXYzine HCL (ATARAX) tablet 25 mg (25 mg Oral Given 3/6/24 0412)    Code Status: Prior  Advance Directive and Living Will:      Power of :    POLST:       Orders Placed This Encounter   Procedures    COVID/FLU/RSV    XR chest 1 view portable    CBC and differential    Basic metabolic panel    Blood gas, venous    CBC and differential    RBC Morphology Reflex Test    Blood gas, venous    CBC and differential    Notify admitting physician    Notify admitting physician on arrival    Insert peripheral IV    Activity as tolerated    Call provider for:  difficulty breathing, headache or visual disturbances    Call provider for:  hives    Call provider for:  persistent dizziness or light-headedness    ECG 12 lead    Place in Observation    Inpatient Admission    Discharge patient     Time  reflects when diagnosis was documented in both MDM as applicable and the Disposition within this note       Time User Action Codes Description Comment    3/5/2024  9:39 PM DominiqueTristian Add [J45.901] Acute asthma exacerbation           ED Disposition       ED Disposition   Admit    Condition   Stable    Date/Time   Tue Mar 5, 2024  9:43 PM    Comment   Case was discussed with SOD and the patient's admission status was agreed to be Admission Status: observation status to the service of   .               Follow-up Information       Follow up With Specialties Details Why Contact Info    Jhony Valentine MD Family Medicine Schedule an appointment as soon as possible for a visit in 1 week(s)  [address] [place] [id number]  [phone]            Discharge Medication List as of 3/7/2024  7:57 AM        START taking these medications    Details   budesonide-formoterol (SYMBICORT) 80-4.5 MCG/ACT inhaler Inhale 2 puffs 2 (two) times a day Rinse mouth after use., Starting Thu 3/7/2024, Normal           CONTINUE these medications which have CHANGED    Details   predniSONE 20 mg tablet Take 3 tablets (60 mg total) by mouth daily for 2 days, Starting Thu 3/7/2024, Until Sat 3/9/2024, Normal           CONTINUE these medications which have NOT CHANGED    Details   albuterol (PROVENTIL HFA,VENTOLIN HFA) 90 mcg/act inhaler Inhale 2 puffs every 6 (six) hours as needed for wheezing, Historical Med           STOP taking these medications       azithromycin (ZITHROMAX) 250 mg tablet Comments:   Reason for Stopping:         FLUoxetine (PROzac) 10 mg capsule Comments:   Reason for Stopping:             Outpatient Discharge Orders   Activity as tolerated     Call provider for:  difficulty breathing, headache or visual disturbances     Call provider for:  hives     Call provider for:  persistent dizziness or light-headedness     Prior to Admission Medications   Prescriptions Last Dose Informant Patient Reported?  "Taking?   FLUoxetine (PROzac) 10 mg capsule Not Taking  No No   Sig: Take 1 capsule (10 mg total) by mouth daily   Patient not taking: Reported on 2024   albuterol (PROVENTIL HFA,VENTOLIN HFA) 90 mcg/act inhaler 3/5/2024 Self Yes Yes   Sig: Inhale 2 puffs every 6 (six) hours as needed for wheezing   azithromycin (ZITHROMAX) 250 mg tablet Not Taking  No No   Sig: Take 2 tablets today then 1 tablet daily x 4 days   Patient not taking: Reported on 3/5/2024   predniSONE 10 mg tablet Not Taking  No No   Si tabs  x 5 days   Patient not taking: Reported on 3/5/2024      Facility-Administered Medications: None                        Portions of the record may have been created with voice recognition software. Occasional wrong word or \"sound a like\" substitutions may have occurred due to the inherent limitations of voice recognition software. Read the chart carefully and recognize, using context, where substitutions have occurred.    Electronically signed by:  Dexter Carey  "

## 2024-03-06 NOTE — UTILIZATION REVIEW
Initial Clinical Review    OBSERVATION WRITTEN 3/5/24 @ 2146 CONVERTED TO INPATIENT ADMISSION 3/6/24 @ 0835 DUE TO FURTHER DIAGNOSTIC WORKUP REQUIRED FOR ASTHMA EXACERBATION, REQUIRING AT LEAST A 2 MIDNIGHT STAY.    Admission: Date/Time/Statement:   Admission Orders (From admission, onward)       Ordered        03/06/24 0835  Inpatient Admission  Once            03/05/24 2146  Place in Observation  Once                          Orders Placed This Encounter   Procedures    Inpatient Admission     Standing Status:   Standing     Number of Occurrences:   1     Order Specific Question:   Level of Care     Answer:   Med Surg [16]     Order Specific Question:   Estimated length of stay     Answer:   More than 2 Midnights     Order Specific Question:   Certification     Answer:   I certify that inpatient services are medically necessary for this patient for a duration of greater than two midnights. See H&P and MD Progress Notes for additional information about the patient's course of treatment.     ED Arrival Information       Expected   -    Arrival   3/5/2024 18:41    Acuity   Urgent              Means of arrival   Walk-In    Escorted by   Self    Service   [facility]    Admission type   Emergency              Arrival complaint   Pain             Chief Complaint   Patient presents with    Shortness of Breath     3 days ago started with sob. H/o asthma. Nebulizer treatments and inhalers not helping.        Initial Presentation: 29 y.o. male who presented to [facility] ED initially admitted Observation status then converted to Inpatient due to Asthma Exacerbation.  Presented due to worsening shortness of breath that started on Saturday and has not been responding to inhalers.  In the ED, Pt tachycardic, O2 sat 96% on RA. CXR pending final read,  Lab work remarkable for VBG with pH 7.21, pCO2 63.4.  Mild leukocytosis at 12.7.  EKG showing sinus tachycardia. Patient received albuterol and ipratropium  nebulizers, 6 mg Decadron, 2 mg magnesium sulfate and nebulized saline as well as terbutaline injection.  Patient received Valium for anxiety.  Wheezing persistent post treatment.  PMH: Asthma Plan: med surg, steroids, albuterol prn, start ICS-formoterol inhaler, order Covid, flu and RSV.     3/6/2024 Inpatient Admission:   Patient was seen tripoding and having conversational dyspnea. Also states of nausea yesterday, denies today. Remains tachycardic, wheezing noted. Continue five day course of steroids, inhaler, albuterol prn, obtain peak flow and repeat VBG, continue to monitor resp status, O2 sats, VS, labs.     ED Triage Vitals   Temperature Pulse Respirations Blood Pressure SpO2   03/05/24 1846 03/05/24 1846 03/05/24 1846 03/05/24 1846 03/05/24 1846   97.5 °F (36.4 °C) (!) 107 22 128/77 96 %      Temp Source Heart Rate Source Patient Position - Orthostatic VS BP Location FiO2 (%)   03/05/24 1846 03/05/24 1846 03/05/24 1846 03/05/24 1846 --   Temporal Monitor Sitting Left arm       Pain Score       03/05/24 2309       3          Wt Readings from Last 1 Encounters:   02/23/24 76.7 kg (169 lb)     Additional Vital Signs:   Date/Time Temp Pulse Resp BP MAP (mmHg) SpO2 O2 Device Patient Position - Orthostatic VS   03/06/24 0919 -- -- -- -- -- 96 % None (Room air) --   03/06/24 08:26:39 98 °F (36.7 °C) 119 Abnormal  -- 131/85 100 92 % -- --   03/06/24 0820 -- -- -- -- -- -- None (Room air) --   03/05/24 23:09:35 97.9 °F (36.6 °C) 127 Abnormal  -- 123/87 99 93 % -- --   03/05/24 23:09:02 97.9 °F (36.6 °C) 130 Abnormal  -- 123/87 99 93 % -- --   03/05/24 22:39:01 98 °F (36.7 °C) 126 Abnormal  -- 123/88 100 94 % -- --   03/05/24 1858 -- 114 Abnormal  -- -- -- 97 % -- --   03/05/24 1846 97.5 °F (36.4 °C) 107 Abnormal  22 128/77 -- 96 % None (Room air) Sitting     Pertinent Labs/Diagnostic Test Results:   XR chest 1 view portable   Final Result by Jt Patten MD (03/06 0603)      Subtle right lower lobe hazy pulmonary  infiltrate noted.      Workstation performed: [id number]           Results from last 7 days   Lab Units 03/05/24  2340   SARS-COV-2  Negative     Results from last 7 days   Lab Units 03/06/24  0454 03/05/24  2131   WBC Thousand/uL 7.93 12.72*   HEMOGLOBIN g/dL 14.2 14.9   HEMATOCRIT % 43.8 45.3   PLATELETS Thousands/uL 240 266   NEUTROS ABS Thousands/µL  --  10.04*   BANDS PCT % 1  --          Results from last 7 days   Lab Units 03/05/24  2131   SODIUM mmol/L 139   POTASSIUM mmol/L 3.8   CHLORIDE mmol/L 102   CO2 mmol/L 28   ANION GAP mmol/L 9   BUN mg/dL 6   CREATININE mg/dL 0.85   EGFR ml/min/1.73sq m 116   CALCIUM mg/dL 9.6     Results from last 7 days   Lab Units 03/05/24  2131   GLUCOSE RANDOM mg/dL 77     Results from last 7 days   Lab Units 03/06/24  1001 03/05/24  2131   PH DEXTER  7.401* 7.216*   PCO2 DEXTER mm Hg 45.8 63.4*   PO2 DEXTER mm Hg 29.8* 44.7   HCO3 DEXTER mmol/L 27.8 25.1   BASE EXC DEXTER mmol/L 2.3 -3.9   O2 CONTENT DEXTER ml/dL 14.0 15.1   O2 HGB, VENOUS % 60.6 73.6     Results from last 7 days   Lab Units 03/05/24  2340   INFLUENZA A PCR  Negative   INFLUENZA B PCR  Negative   RSV PCR  Negative     ED Treatment:   Medication Administration from 03/05/2024 1841 to 03/05/2024 2224         Date/Time Order Dose Route Action     03/05/2024 1858 EST albuterol inhalation solution 5 mg 5 mg Nebulization Given     03/05/2024 1858 EST ipratropium (ATROVENT) 0.02 % inhalation solution 0.5 mg 0.5 mg Nebulization Given     03/05/2024 1915 EST dexamethasone (DECADRON) tablet 6 mg 6 mg Oral Given     03/05/2024 1936 EST magnesium sulfate 2 g/50 mL IVPB (premix) 2 g 2 g Intravenous New Bag     03/05/2024 1931 EST albuterol inhalation solution 10 mg 10 mg Nebulization Given     03/05/2024 1933 EST ipratropium (ATROVENT) 0.02 % inhalation solution 1 mg 1 mg Nebulization Given     03/05/2024 1934 EST sodium chloride 0.9 % inhalation solution 12 mL 12 mL Nebulization Given     03/05/2024 1938 EST sodium chloride 0.9 % bolus  1,000 mL 1,000 mL Intravenous New Bag     03/05/2024 2038 EST terbutaline (BRETHINE) injection 0.25 mg 0.25 mg Subcutaneous Given     03/05/2024 2034 EST diazepam (VALIUM) injection 2.5 mg 2.5 mg Intravenous Given     03/05/2024 2036 EST albuterol inhalation solution 10 mg 10 mg Nebulization Given     03/05/2024 2036 EST ipratropium (ATROVENT) 0.02 % inhalation solution 1 mg 1 mg Nebulization Given     03/05/2024 2036 EST sodium chloride 0.9 % inhalation solution 12 mL 12 mL Nebulization Given          Past Medical History:   Diagnosis Date    Anxiety     Asthma      Present on Admission:   Asthma exacerbation      Admitting Diagnosis: Pain [R52]  Acute asthma exacerbation [J45.901]  Age/Sex: 29 y.o. male  Admission Orders:  Scheduled Medications:  budesonide-formoterol, 2 puff, Inhalation, BID  ipratropium, 0.5 mg, Nebulization, TID  levalbuterol, 1.25 mg, Nebulization, TID  predniSONE, 60 mg, Oral, Daily      Continuous IV Infusions: none     PRN Meds:  albuterol, 2.5 mg, Nebulization, Q4H PRN x1 thus far    [facility] [place]    Network Utilization Review Department  ATTENTION: Please call with any questions or concerns to [phone] and carefully listen to the prompts so that you are directed to the right person. All voicemails are confidential.   For Discharge needs, contact Care Management DC Support Team at [phone] opt. 2  Send all requests for admission clinical reviews, approved or denied determinations and any other requests to dedicated fax number below belonging to the [place] where the patient is receiving treatment. List of dedicated fax numbers for the Facilities:  FACILITY NAME UR FAX NUMBER   ADMISSION DENIALS (Administrative/Medical Necessity) [phone]   DISCHARGE SUPPORT TEAM (NETWORK) [phone]   PARENT CHILD HEALTH (Maternity/NICU/Pediatrics) [phone]   [facility] [phone]   [facility] [phone]   [facility]  [place] [phone]   [facility] [phone]   [facility] [phone]   [facility] [phone]   [facility] [phone]   [facility] [phone]   [facility] [phone]   [facility] [phone]   [facility] [phone]   [facility] [phone]

## 2024-03-07 ENCOUNTER — OFFICE VISIT (OUTPATIENT)
Dept: PULMONOLOGY | Facility: CLINIC | Age: 29
End: 2024-03-07
Payer: COMMERCIAL

## 2024-03-07 VITALS
DIASTOLIC BLOOD PRESSURE: 80 MMHG | HEIGHT: 69 IN | OXYGEN SATURATION: 96 % | BODY MASS INDEX: 23.99 KG/M2 | RESPIRATION RATE: 16 BRPM | TEMPERATURE: 96.7 F | WEIGHT: 162 LBS | HEART RATE: 113 BPM | SYSTOLIC BLOOD PRESSURE: 136 MMHG

## 2024-03-07 VITALS
RESPIRATION RATE: 15 BRPM | SYSTOLIC BLOOD PRESSURE: 120 MMHG | OXYGEN SATURATION: 94 % | WEIGHT: 162.7 LBS | DIASTOLIC BLOOD PRESSURE: 83 MMHG | HEIGHT: 69 IN | TEMPERATURE: 98.1 F | BODY MASS INDEX: 24.1 KG/M2 | HEART RATE: 98 BPM

## 2024-03-07 DIAGNOSIS — J45.51 SEVERE PERSISTENT ASTHMA WITH EXACERBATION: Primary | ICD-10-CM

## 2024-03-07 DIAGNOSIS — K21.9 GASTROESOPHAGEAL REFLUX DISEASE WITHOUT ESOPHAGITIS: ICD-10-CM

## 2024-03-07 DIAGNOSIS — J30.1 SEASONAL ALLERGIC RHINITIS DUE TO POLLEN: ICD-10-CM

## 2024-03-07 LAB
BASOPHILS # BLD AUTO: 0.05 THOUSANDS/ÂΜL (ref 0–0.1)
BASOPHILS NFR BLD AUTO: 0 % (ref 0–1)
EOSINOPHIL # BLD AUTO: 0.04 THOUSAND/ÂΜL (ref 0–0.61)
EOSINOPHIL NFR BLD AUTO: 0 % (ref 0–6)
ERYTHROCYTE [DISTWIDTH] IN BLOOD BY AUTOMATED COUNT: 14 % (ref 11.6–15.1)
HCT VFR BLD AUTO: 43.9 % (ref 36.5–49.3)
HGB BLD-MCNC: 14.4 G/DL (ref 12–17)
IMM GRANULOCYTES # BLD AUTO: 0.09 THOUSAND/UL (ref 0–0.2)
IMM GRANULOCYTES NFR BLD AUTO: 0 % (ref 0–2)
LYMPHOCYTES # BLD AUTO: 1.67 THOUSANDS/ÂΜL (ref 0.6–4.47)
LYMPHOCYTES NFR BLD AUTO: 8 % (ref 14–44)
MCH RBC QN AUTO: 25.7 PG (ref 26.8–34.3)
MCHC RBC AUTO-ENTMCNC: 32.8 G/DL (ref 31.4–37.4)
MCV RBC AUTO: 78 FL (ref 82–98)
MONOCYTES # BLD AUTO: 1.23 THOUSAND/ÂΜL (ref 0.17–1.22)
MONOCYTES NFR BLD AUTO: 6 % (ref 4–12)
NEUTROPHILS # BLD AUTO: 18.48 THOUSANDS/ÂΜL (ref 1.85–7.62)
NEUTS SEG NFR BLD AUTO: 86 % (ref 43–75)
NRBC BLD AUTO-RTO: 0 /100 WBCS
PLATELET # BLD AUTO: 276 THOUSANDS/UL (ref 149–390)
PMV BLD AUTO: 12.1 FL (ref 8.9–12.7)
RBC # BLD AUTO: 5.6 MILLION/UL (ref 3.88–5.62)
WBC # BLD AUTO: 21.56 THOUSAND/UL (ref 4.31–10.16)

## 2024-03-07 PROCEDURE — 94640 AIRWAY INHALATION TREATMENT: CPT

## 2024-03-07 PROCEDURE — 99238 HOSP IP/OBS DSCHRG MGMT 30/<: CPT | Performed by: INTERNAL MEDICINE

## 2024-03-07 PROCEDURE — 85025 COMPLETE CBC W/AUTO DIFF WBC: CPT

## 2024-03-07 PROCEDURE — 94760 N-INVAS EAR/PLS OXIMETRY 1: CPT

## 2024-03-07 PROCEDURE — 94664 DEMO&/EVAL PT USE INHALER: CPT

## 2024-03-07 PROCEDURE — 99205 OFFICE O/P NEW HI 60 MIN: CPT | Performed by: INTERNAL MEDICINE

## 2024-03-07 RX ORDER — BUDESONIDE AND FORMOTEROL FUMARATE DIHYDRATE 80; 4.5 UG/1; UG/1
2 AEROSOL RESPIRATORY (INHALATION)
Qty: 10.2 G | Refills: 0 | Status: SHIPPED | OUTPATIENT
Start: 2024-03-07 | End: 2024-03-07

## 2024-03-07 RX ORDER — PREDNISONE 10 MG/1
TABLET ORAL
Qty: 30 TABLET | Refills: 0 | Status: SHIPPED | OUTPATIENT
Start: 2024-03-07

## 2024-03-07 RX ORDER — PSEUDOEPHEDRINE HCL 120 MG/1
120 TABLET, FILM COATED, EXTENDED RELEASE ORAL DAILY PRN
COMMUNITY
Start: 2023-06-18 | End: 2024-06-17

## 2024-03-07 RX ORDER — AZITHROMYCIN 250 MG/1
TABLET, FILM COATED ORAL
Qty: 6 TABLET | Refills: 0 | Status: SHIPPED | OUTPATIENT
Start: 2024-03-07 | End: 2024-03-11

## 2024-03-07 RX ORDER — PREDNISONE 20 MG/1
60 TABLET ORAL DAILY
Qty: 6 TABLET | Refills: 0 | Status: SHIPPED | OUTPATIENT
Start: 2024-03-07 | End: 2024-03-07

## 2024-03-07 RX ORDER — BUDESONIDE AND FORMOTEROL FUMARATE DIHYDRATE 160; 4.5 UG/1; UG/1
2 AEROSOL RESPIRATORY (INHALATION) 2 TIMES DAILY
Qty: 10.2 G | Refills: 1 | Status: SHIPPED | OUTPATIENT
Start: 2024-03-07

## 2024-03-07 RX ADMIN — BUDESONIDE AND FORMOTEROL FUMARATE DIHYDRATE 2 PUFF: 80; 4.5 AEROSOL RESPIRATORY (INHALATION) at 07:35

## 2024-03-07 RX ADMIN — LEVALBUTEROL HYDROCHLORIDE 1.25 MG: 1.25 SOLUTION RESPIRATORY (INHALATION) at 07:44

## 2024-03-07 RX ADMIN — IPRATROPIUM BROMIDE 0.5 MG: 0.5 SOLUTION RESPIRATORY (INHALATION) at 07:44

## 2024-03-07 RX ADMIN — PREDNISONE 60 MG: 20 TABLET ORAL at 08:03

## 2024-03-07 NOTE — DISCHARGE SUMMARY
[facility]   DISCHARGE- Johnathan Uribe, 1995, 29 y.o. male MRN: [id number]   Unit/Bed#: [facility] 907/[facility] 907-01 Encounter: [id number]   Primary Care Physician: Jhony Valentine MD   Date and Time Admitted to Hospital: 3/5/2024  6:55 PM     Assessment/Plan    * Asthma exacerbation  Assessment & Plan  Admitted for asthma exacerbation, typically managed with as needed albuterol triggered by allergies, either cat dander or seasonal allergies.  Received Decadron, magnesium, nebulizers, terbutaline in the ED    Although patient reported improvement of symptoms he states he was tripoding and unable to complete full sentences on breath.  -WBC 21, however no signs of active infection-likely reactive in setting of steroid use    5-day course of steroids ending 3/9  Initiated on ICS-formoterol inhaler  Continue with SYLVIE prn  No antibiotics at this time  Pulm appt this morning at 9:20 with Dr. Nieto       Medical Problems       Resolved Problems  Date Reviewed: 3/7/2024   None             Discharging Resident: Ben Max MD  Discharging Attending: No att. providers found  PCP: Jhony Valentine MD  Admission Date:   Admission Orders (From admission, onward)       Ordered        03/06/24 0835  Inpatient Admission  Once            03/05/24 2146  Place in Observation  Once                          Discharge Date: 03/07/24    Consultations During Hospital Stay:  None     Procedures Performed:   None    Significant Findings / Test Results:   None    Incidental Findings:   Chest x-ray shows subtle right lower lobe hazy pulmonary infiltrate   I reviewed the above mentioned incidental findings with the patient and/or family and they expressed understanding.    Test Results Pending at Discharge (will require follow up):  None     Outpatient Tests Requested:  None    Complications: None    Reason for Admission: Shortness of breath due to asthma exacerbation    Hospital Course:   Johnathan  "Cal Uribe is a 29 y.o. male who presented to the hospital on 3/5/2024 initially due to worsening shortness of breath that started on Saturday and has not been responding to his albuterol inhaler.       While in the ED, patient received albuterol and ipratropium nebulizers, 6 mg Decadron, 2 mg magnesium sulfate and nebulized saline as well as terbutaline injection.  Patient received Valium for anxiety.     He was then admitted and transitioned from Decadron to oral prednisone 60 mg for 4 more days for total of 5 days of steroid treatment ending 3/9.  He was also transitioned from nebulized saline to now Symbicort inhaler, continuing to get nebulizer treatments 3 times a day.  He follows with Dr. Nieto pulmonology, next appointment is 3/7/2024.  The patient did have some leukocytosis however his white blood cell count did normalize the following day.  Repeat VBG shows improved pH and CO2.  Peak flow is at 550.  Patient was tentatively to be discharged on 3/6 however patient was still not back to baseline but significantly improved.  Patient and team agreed that he should stay overnight and will be discharged first thing on 3/7 for his appointment with Dr. Caballero.  Morning patient states he does feel significantly better.  He is not getting winded when walking and additionally can speak in full sentences with no difficulty.  Patient does endorse a slight cough that does happen from time to time but overall he feels significantly improved.      Please see above list of diagnoses and related plan for additional information.     Condition at Discharge: Stable    Discharge Day Visit / Exam:   Vitals: Blood Pressure: 120/83 (03/07/24 0735)  Pulse: 98 (03/07/24 0735)  Temperature: 98.1 °F (36.7 °C) (03/07/24 0735)  Temp Source: Oral (03/06/24 1632)  Respirations: 15 (03/07/24 0735)  Height: 5' 9\" (175.3 cm) (03/06/24 1632)  Weight - Scale: 73.8 kg (162 lb 11.2 oz) (03/06/24 1632)  SpO2: 94 % (03/07/24 " 0735)    Physical Exam  Vitals reviewed.   Constitutional:       Appearance: Normal appearance.   HENT:      Head: Normocephalic and atraumatic.      Mouth/Throat:      Mouth: Mucous membranes are moist.      Pharynx: Oropharynx is clear.   Cardiovascular:      Rate and Rhythm: Normal rate and regular rhythm.   Pulmonary:      Effort: Pulmonary effort is normal.      Breath sounds: Wheezing present.   Abdominal:      General: Abdomen is flat. Bowel sounds are normal. There is no distension.      Tenderness: There is no abdominal tenderness.   Musculoskeletal:      Right lower leg: No edema.      Left lower leg: No edema.   Neurological:      Mental Status: He is alert and oriented to person, place, and time.   Psychiatric:         Mood and Affect: Mood normal.         Behavior: Behavior normal.            Discharge instructions/Information to patient and family:   See after visit summary for information provided to patient and family.      Provisions for Follow-Up Care:  See after visit summary for information related to follow-up care and any pertinent home health orders.       Disposition:   Home    Planned Readmission: No     Discharge Medications:  See after visit summary for reconciled discharge medications provided to patient and/or family.      **Please Note: This note may have been constructed using a voice recognition system**

## 2024-03-07 NOTE — UTILIZATION REVIEW
SEE INITIAL REVIEW AT BOTTOM       Continued Stay Review    Date: 3/7/2024                        Current Patient Class: inpatient  Current Level of Care: med/surg  HPI:29 y.o. male initially admitted on 3/5 obs to inpatient 3/6 with acute asthma exacerbation    Assessment/Plan: He reports feeling improved but not back to baseline. He reports some yellow sputum production. Continues on 5-day course of steroids ending 3/9. Initiated on ICS-formoterol inhaler, prn SYLVIE.       Vital Signs:   Date/Time Temp Pulse Resp BP MAP (mmHg) SpO2 O2 Device   03/07/24 07:35:02 98.1 °F (36.7 °C) 98 15 120/83 95 94 % --   03/06/24 2236 -- -- -- -- -- 95 % None (Room air)   03/06/24 2042 -- -- -- -- -- 93 % --   03/06/24 16:32:31 98.3 °F (36.8 °C) 113 Abnormal  16 134/86 102 94 % None (Room air)   03/06/24 15:21:26 98.6 °F (37 °C) 125 Abnormal  -- 136/88 104 90 % --       Pertinent Labs/Diagnostic Results:   Results from last 7 days   Lab Units 03/07/24  0458 03/06/24  0454 03/05/24  2131   WBC Thousand/uL 21.56* 7.93 12.72*   HEMOGLOBIN g/dL 14.4 14.2 14.9   HEMATOCRIT % 43.9 43.8 45.3   PLATELETS Thousands/uL 276 240 266   NEUTROS ABS Thousands/µL 18.48*  --  10.04*   BANDS PCT %  --  1  --      Medications:   Scheduled Medications:  budesonide-formoterol, 2 puff, Inhalation, BID  ipratropium, 0.5 mg, Nebulization, TID  levalbuterol, 1.25 mg, Nebulization, TID  predniSONE, 60 mg, Oral, Daily    PRN Meds:  acetaminophen, 650 mg, Oral, Q6H PRN 3/6 x1  albuterol, 2.5 mg, Nebulization, Q4H PRN 3/6 x1      Discharge Plan: home when medically stable      Network Utilization Review Department  ATTENTION: Please call with any questions or concerns to [phone] and carefully listen to the prompts so that you are directed to the right person. All voicemails are confidential.   For Discharge needs, contact Care Management DC Support Team at [phone] opt. 2  Send all requests for admission clinical reviews, approved or denied  determinations and any other requests to dedicated fax number below belonging to the campus where the patient is receiving treatment. List of dedicated fax numbers for the Facilities:  FACILITY NAME UR FAX NUMBER   ADMISSION DENIALS (Administrative/Medical Necessity) [phone]   DISCHARGE SUPPORT TEAM (NETWORK) [phone]   PARENT CHILD HEALTH (Maternity/NICU/Pediatrics) [phone]   [facility] [phone]   [facility] [phone]   [facility] [phone]   [facility] [phone]   [facility] [phone]   [facility] [phone]   General acute hospital [phone]   [facility] [phone]   [facility] [phone]   [facility] [phone]   [facility] [phone]   [facility] [phone]

## 2024-03-07 NOTE — PATIENT INSTRUCTIONS
Start symbicort 2puffs twice daily with spacer use - rinse mouth after use  Continue prednisone taper  Complete Zpack antibiotic course  Follow peak flows  Use Albuterol 2puffs every 4 hours as needed for shortness of breath  Get labs drawn in 1 month  Follow up in 4-6 weeks

## 2024-03-07 NOTE — LETTER
March 7, 2024     Patient: Johnathan Uribe  YOB: 1995  Date of Visit: 3/7/2024      To Whom it May Concern:    Johnathan Uribe is under my professional care. Johnathan was seen in my office on 3/7/2024. Johnathan may return to school on 3/11/2024 - he was admitted to hospital from 3/5-3/7 2024 and should be excused these days as well .    If you have any questions or concerns, please don't hesitate to call.         Sincerely,          German Nieto, DO        CC: No Recipients

## 2024-03-07 NOTE — PROGRESS NOTES
Pulmonary Consultation   Johnathan Uribe 29 y.o. male MRN: [id number]  3/7/2024      Reason for Consultation:  Asthma evaluation    Requested by: patient    Assessment:  Asthma - severe persistent with acute exacerbation  Symptom complex very consistent with asthma  No maintenance inhaler use and poor baseline control  Noted eosinophilia  Will plan symbicort 160/4.5 2puffs BID with spacer use - provided today  PRN SYLVIE inhaler for now  Reviewed asthmatic inflammation and role of maintenance and rescue inhalers  Follow peak flows and SYLVIE needs  Check NE RAST and repeat Abs Eos in 4-6 weeks  Check spirometry with next visit  Did not obtain flu vaccine this year  Follow up in 4-6 weeks or sooner as needed    Allergic rhinitis - check NE RAST for now    Abnormal CXR  Right basilar infiltrate, some sputum purulence  Will treat for CAP with course of azithromycin  Consider repeat CXR in 6-8 weeks based upon symptoms    Eosinophilia - as above    Marijuana use - encouraged to limited use, find alternate routes for ingestion  Plan:    Diagnoses and all orders for this visit:    Severe persistent asthma with exacerbation  -     budesonide-formoterol (Symbicort) 160-4.5 mcg/act inhaler; Inhale 2 puffs 2 (two) times a day Rinse mouth after use.  -     predniSONE 10 mg tablet; By mouth take 4 tablets daily x 3 days, then 3 tablets daily x 3 days, then 2 tablets daily x 3 days, then 1 tablet daily x 3 days  -     azithromycin (ZITHROMAX) 250 mg tablet; Take 2 tablets today then 1 tablet daily x 4 days  -     CBC and differential; Future    Seasonal allergic rhinitis due to pollen  -     [facility] Allergy Panel, Adult; Future    Gastroesophageal reflux disease without esophagitis    Other orders  -     pseudoephedrine (SUDAFED) 120 MG 12 hr tablet; Take 120 mg by mouth daily as needed (Patient not taking: Reported on 3/7/2024)        History of Present Illness   HPI:  Johnathan Uribe is a 29 y.o. male who has reported  longstanding asthma, allergic rhinitis, and GERD. He presents for asthma evaluation.      He reports longstanding asthma since a child. He had admission as child but denied any intubations. He reports urgent care visits 1-2 times a year. He reports he only uses SYLVIE and uses at least daily but often multiple times a day.  He had increased dyspnea over the past few weeks.  On 3/5 presented to ED due to severe dyspnea and inability to speak in full sentences.  ED course reviewed and required duoneb x 1, 10mg BAUTISTA neb with ipratropium x 2, decadron, magnesium and terbutaline.  Admitted for observation and released this am. He reports feeling improved but not back to baseline. He reports some yellow sputum production, denied sick contacts, denied hemoptysis, no fevers or chills, no pleurisy.     He notes symptom complex of wheeze, cough, dyspnea, chest tightness. He has increased allergy symptoms in Spring and Fall. He denied worsened symptoms with cold air. He works in [employer] and uses mask.      Review of Systems   Constitutional:  Positive for activity change. Negative for chills, fatigue, fever and unexpected weight change.   HENT:  Positive for postnasal drip, rhinorrhea and sneezing. Negative for mouth sores, sore throat and trouble swallowing.    Respiratory:  Positive for cough, chest tightness, shortness of breath and wheezing.    Cardiovascular:  Negative for chest pain, palpitations and leg swelling.   Gastrointestinal:  Negative for abdominal pain, nausea and vomiting.   Endocrine: Negative for cold intolerance and heat intolerance.   Musculoskeletal:  Negative for gait problem.   Skin:  Negative for rash.   Allergic/Immunologic: Positive for environmental allergies. Negative for immunocompromised state.   Neurological:  Negative for light-headedness and headaches.   Hematological:  Negative for adenopathy.   Psychiatric/Behavioral:  Positive for sleep disturbance. The patient is not nervous/anxious.   "      Historical Information   Past Medical History:   Diagnosis Date    Allergic rhinitis     Anxiety     Asthma     GERD (gastroesophageal reflux disease)      Past Surgical History:   Procedure Laterality Date    CIRCUMCISION      VASECTOMY       Family History   Problem Relation Age of Onset    Hypertension Mother     Hyperlipidemia Mother     Diabetes Father     Asthma Sister        Occupational History: works HVAC instillation, maintenance and repair    Social History: no tobacco use, marijuana use 1-2 times a week, pet dog, 2 daughters    Meds/Allergies     Current Outpatient Medications:     albuterol (PROVENTIL HFA,VENTOLIN HFA) 90 mcg/act inhaler, Inhale 2 puffs every 6 (six) hours as needed for wheezing, Disp: , Rfl:     azithromycin (ZITHROMAX) 250 mg tablet, Take 2 tablets today then 1 tablet daily x 4 days, Disp: 6 tablet, Rfl: 0    budesonide-formoterol (Symbicort) 160-4.5 mcg/act inhaler, Inhale 2 puffs 2 (two) times a day Rinse mouth after use., Disp: 10.2 g, Rfl: 1    predniSONE 10 mg tablet, By mouth take 4 tablets daily x 3 days, then 3 tablets daily x 3 days, then 2 tablets daily x 3 days, then 1 tablet daily x 3 days, Disp: 30 tablet, Rfl: 0    pseudoephedrine (SUDAFED) 120 MG 12 hr tablet, Take 120 mg by mouth daily as needed (Patient not taking: Reported on 3/7/2024), Disp: , Rfl:   No current facility-administered medications for this visit.  Allergies   Allergen Reactions    Cats Claw (Uncaria Tomentosa) Cough       Vitals: Blood pressure 136/80, pulse (!) 113, temperature (!) 96.7 °F (35.9 °C), temperature source Tympanic, resp. rate 16, height 5' 9\" (1.753 m), weight 73.5 kg (162 lb), SpO2 96%., Body mass index is 23.92 kg/m². Oxygen Therapy  SpO2: 96 %  Oxygen Therapy: None (Room air)    Physical Exam  Physical Exam  Vitals reviewed.   Constitutional:       General: He is not in acute distress.     Appearance: Normal appearance. He is well-developed and normal weight. He is not " ill-appearing, toxic-appearing or diaphoretic.   HENT:      Head: Normocephalic and atraumatic.      Right Ear: External ear normal.      Left Ear: External ear normal.      Nose: Congestion and rhinorrhea present.      Mouth/Throat:      Mouth: Mucous membranes are moist.      Pharynx: Oropharynx is clear. No oropharyngeal exudate.      Comments: No thrush  Eyes:      General: No scleral icterus.        Right eye: No discharge.         Left eye: No discharge.      Conjunctiva/sclera: Conjunctivae normal.      Pupils: Pupils are equal, round, and reactive to light.   Neck:      Vascular: No JVD.      Trachea: No tracheal deviation.   Cardiovascular:      Rate and Rhythm: Normal rate and regular rhythm.      Heart sounds: Normal heart sounds. No murmur heard.  Pulmonary:      Effort: Pulmonary effort is normal. No respiratory distress.      Breath sounds: No stridor. Wheezing present. No rhonchi or rales.      Comments: Speaking full sentences, diffuse expiratory wheeze bilaterally  Abdominal:      General: Bowel sounds are normal. There is no distension.      Palpations: Abdomen is soft.      Tenderness: There is no abdominal tenderness. There is no guarding.   Musculoskeletal:         General: No swelling or deformity.      Cervical back: Neck supple.      Right lower leg: No edema.      Left lower leg: No edema.   Lymphadenopathy:      Cervical: No cervical adenopathy.   Skin:     General: Skin is warm and dry.      Coloration: Skin is not jaundiced.      Findings: No rash.   Neurological:      General: No focal deficit present.      Mental Status: He is alert and oriented to person, place, and time.   Psychiatric:         Mood and Affect: Mood normal.         Behavior: Behavior normal.         Thought Content: Thought content normal.         Labs: I have personally reviewed pertinent lab results.  Lab Results   Component Value Date    WBC 21.56 (H) 03/07/2024    HGB 14.4 03/07/2024    HCT 43.9 03/07/2024    MCV  "78 (L) 03/07/2024     03/07/2024     Lab Results   Component Value Date    CALCIUM 9.6 03/05/2024    K 3.8 03/05/2024    CO2 28 03/05/2024     03/05/2024    BUN 6 03/05/2024    CREATININE 0.85 03/05/2024     No results found for: \"IGE\"  No results found for: \"ALT\", \"AST\", \"GGT\", \"ALKPHOS\", \"BILITOT\"    Abs Eos 3/5/2024- 550    Imaging and other studies: I have personally reviewed pertinent reports.   and I have personally reviewed pertinent films in PACS  CXR 3/5 - no dense infiltrates, no PTX, no effusions, mild right basilar ground glass infiltrate    Pulmonary function testing: none available     German Nieto DO, FACP  [facility]  "

## 2024-03-08 ENCOUNTER — TRANSITIONAL CARE MANAGEMENT (OUTPATIENT)
Dept: FAMILY MEDICINE CLINIC | Facility: CLINIC | Age: 29
End: 2024-03-08

## 2024-03-09 LAB
ATRIAL RATE: 122 BPM
P AXIS: 74 DEGREES
PR INTERVAL: 114 MS
QRS AXIS: 80 DEGREES
QRSD INTERVAL: 74 MS
QT INTERVAL: 308 MS
QTC INTERVAL: 438 MS
T WAVE AXIS: 89 DEGREES
VENTRICULAR RATE: 122 BPM

## 2024-03-09 PROCEDURE — 93010 ELECTROCARDIOGRAM REPORT: CPT | Performed by: INTERNAL MEDICINE

## 2024-03-20 ENCOUNTER — OFFICE VISIT (OUTPATIENT)
Dept: FAMILY MEDICINE CLINIC | Facility: CLINIC | Age: 29
End: 2024-03-20
Payer: COMMERCIAL

## 2024-03-20 VITALS
RESPIRATION RATE: 16 BRPM | HEIGHT: 67 IN | OXYGEN SATURATION: 98 % | DIASTOLIC BLOOD PRESSURE: 70 MMHG | HEART RATE: 83 BPM | BODY MASS INDEX: 28.72 KG/M2 | WEIGHT: 183 LBS | SYSTOLIC BLOOD PRESSURE: 118 MMHG | TEMPERATURE: 97.9 F

## 2024-03-20 DIAGNOSIS — J18.9 PNEUMONIA OF BOTH LOWER LOBES DUE TO INFECTIOUS ORGANISM: ICD-10-CM

## 2024-03-20 DIAGNOSIS — J45.51 SEVERE PERSISTENT ASTHMA WITH EXACERBATION: Primary | ICD-10-CM

## 2024-03-20 DIAGNOSIS — F41.9 ANXIETY: ICD-10-CM

## 2024-03-20 PROCEDURE — 99495 TRANSJ CARE MGMT MOD F2F 14D: CPT | Performed by: FAMILY MEDICINE

## 2024-03-20 RX ORDER — BUPROPION HYDROCHLORIDE 150 MG/1
150 TABLET ORAL EVERY MORNING
Qty: 30 TABLET | Refills: 5 | Status: SHIPPED | OUTPATIENT
Start: 2024-03-20 | End: 2024-09-16

## 2024-03-20 RX ORDER — BUDESONIDE AND FORMOTEROL FUMARATE DIHYDRATE 80; 4.5 UG/1; UG/1
AEROSOL RESPIRATORY (INHALATION)
COMMUNITY
Start: 2024-03-07 | End: 2024-03-20 | Stop reason: SDUPTHER

## 2024-03-20 NOTE — PROGRESS NOTES
Name: Johnathan Uribe      : 1995      MRN: [id number]  Encounter Provider: Jhony Valentine MD  Encounter Date: 3/20/2024   Encounter department: [facility]    Assessment & Plan   ssessment and Plan  Asthma: Patient reports improvement post-exacerbation. Continue ICS-formoterol inhaler as prescribed and SYLVIE prn. Monitor for any signs of recurrence or worsening of symptoms.  Pneumonia: Treated with Zithromax post-discharge with resolution of symptoms. Repeat chest x-ray to confirm resolution of pneumonia.  Allergies: Possible trigger for asthma exacerbation. Plan to conduct allergy testing to identify specific allergens and guide avoidance strategies.  Anxiety, explained perhaps related to prednisone, insist is prenuptial anxiety and can not sleep. Will do short anxiety medication Bupropion.  1. Severe persistent asthma with exacerbation    2. Anxiety  -     buPROPion (WELLBUTRIN XL) 150 mg 24 hr tablet; Take 1 tablet (150 mg total) by mouth every morning    3. Pneumonia of both lower lobes due to infectious organism  -     XR hand 2 vw right; Future; Expected date: 2024         TCM Call       Date and time call was made  3/8/2024 11:34 AM    Patient was hospitialized at  [facility]    Date of Admission  24    Date of discharge  24    Disposition  Home    Current Symptoms  None          TCM Call       Post hospital issues  None    Scheduled for follow up?  Yes    Did you obtain your prescribed medications  Yes    Do you need help managing your prescriptions or medications  No    I have advised the patient to call PCP with any new or worsening symptoms  Arielle Russ MA             Subjective      Subjective  Patient presents for follow-up after recent hospitalization for asthma exacerbation from  to . Reports typical asthma triggers include cat dander and seasonal allergies. Despite initial improvement with ED treatments  "including Decadron, magnesium, nebulizers, and terbutaline, patient experienced severe symptoms including tripoding and dyspnea. Completed a 5-day course of steroids on 3/9 and was started on an ICS-formoterol inhaler. Continues to use SYLVIE as needed. No antibiotics were prescribed during hospital stay. However, upon discharge, was evaluated by a Pulmonary specialist, diagnosed with pneumonia, and treated with Zithromax, which led to symptom resolution. Currently asymptomatic.    Imaging and other Relevant Results  Chest x-ray to be repeated to assess resolution of pneumonia.    Fells anxious.  History of 6 months of :  Difficulty controlling worries, restlessness or feeling on edge, easily fatigued, difficulty concentrating, irritability, muscle tension. Worsening with date of getting  getting closer.      Review of Systems    Current Outpatient Medications on File Prior to Visit   Medication Sig    albuterol (PROVENTIL HFA,VENTOLIN HFA) 90 mcg/act inhaler Inhale 2 puffs every 6 (six) hours as needed for wheezing    budesonide-formoterol (Symbicort) 160-4.5 mcg/act inhaler Inhale 2 puffs 2 (two) times a day Rinse mouth after use.       Objective     /70 (BP Location: Left arm, Patient Position: Sitting, Cuff Size: Standard)   Pulse 83   Temp 97.9 °F (36.6 °C) (Tympanic)   Resp 16   Ht 5' 7\" (1.702 m)   Wt 83 kg (183 lb)   SpO2 98%   BMI 28.66 kg/m²     Physical Exam  Cardiovascular:      Rate and Rhythm: Normal rate and regular rhythm.   Pulmonary:      Effort: No respiratory distress.      Breath sounds: Normal breath sounds. No stridor. No wheezing or rhonchi.       Jhony Valentine MD    "

## 2024-04-16 DIAGNOSIS — J45.51 SEVERE PERSISTENT ASTHMA WITH EXACERBATION: ICD-10-CM

## 2024-04-18 RX ORDER — BUDESONIDE AND FORMOTEROL FUMARATE DIHYDRATE 160; 4.5 UG/1; UG/1
2 AEROSOL RESPIRATORY (INHALATION) 2 TIMES DAILY
Qty: 30.6 G | Refills: 3 | Status: SHIPPED | OUTPATIENT
Start: 2024-04-18

## 2024-07-09 DIAGNOSIS — J45.51 SEVERE PERSISTENT ASTHMA WITH EXACERBATION: ICD-10-CM

## 2024-07-09 RX ORDER — BUDESONIDE AND FORMOTEROL FUMARATE DIHYDRATE 160; 4.5 UG/1; UG/1
2 AEROSOL RESPIRATORY (INHALATION) 2 TIMES DAILY
Qty: 30.6 G | Refills: 1 | Status: SHIPPED | OUTPATIENT
Start: 2024-07-09

## 2024-07-09 NOTE — TELEPHONE ENCOUNTER
Started process on [employer] for his script but wanted to double check was going to [facility] not [facility].  Needs high Priority

## 2024-08-29 ENCOUNTER — OFFICE VISIT (OUTPATIENT)
Dept: PULMONOLOGY | Facility: CLINIC | Age: 29
End: 2024-08-29
Payer: COMMERCIAL

## 2024-08-29 VITALS
TEMPERATURE: 97.6 F | OXYGEN SATURATION: 96 % | HEART RATE: 84 BPM | HEIGHT: 67 IN | WEIGHT: 183 LBS | SYSTOLIC BLOOD PRESSURE: 112 MMHG | DIASTOLIC BLOOD PRESSURE: 74 MMHG | BODY MASS INDEX: 28.72 KG/M2

## 2024-08-29 DIAGNOSIS — J45.41 MODERATE PERSISTENT ASTHMA WITH EXACERBATION: Primary | ICD-10-CM

## 2024-08-29 DIAGNOSIS — J30.1 SEASONAL ALLERGIC RHINITIS DUE TO POLLEN: ICD-10-CM

## 2024-08-29 PROCEDURE — 99213 OFFICE O/P EST LOW 20 MIN: CPT | Performed by: INTERNAL MEDICINE

## 2024-08-29 NOTE — PATIENT INSTRUCTIONS
Continue Symbicort - can try reducing to 1puff twice daily  Use symbicort 1-2puffs every 6 hours as needed for shortness of breath, wheeze, cough  Use OTC allergy medications as needed for seasonal allergies  Consider getting flu shot this fall

## 2024-08-29 NOTE — PROGRESS NOTES
Pulmonary Follow Up Note   Johnathan Uribe 29 y.o. male MRN: [id number]  8/29/2024      Assessment:  Asthma - moderate persistent  Symptom complex very consistent with asthma  Clinically improved with ICS/LABA - will give trial of reducing to 1puff BID  PRN symbicort per MICHAEL guidelines as well  Given improvements, hold on repeat CBC and NE RAST for now  Flu vaccine encouraged     Allergic rhinitis - nasal saline irrigation, prn OTC antihistamines     Abnormal CXR  Fully resolved symptoms, normal exam, hold on repeat CXR for now     Eosinophilia - as above       Plan:    Diagnoses and all orders for this visit:    Moderate persistent asthma with exacerbation    Seasonal allergic rhinitis due to pollen        Return in about 1 year (around 8/29/2025) for Recheck.    History of Present Illness   HPI:  Johnathan Uribe is a 29 y.o. male who has asthma, allergic rhinitis, and GERD. He presented in March 2024 for asthma evaluation.  He was started on symbicort and presents today for follow up.        He reports feeling well. No SYLVIE use since starting symbicort. Denied cough or sputum production, no hemoptysis. No nocturnal dyspnea, no reflux symptoms. Uses OTC allergy medication intermittently.  No issues with heat and humidity or at work.     Review of Systems   Constitutional:  Negative for activity change, chills and fever.   HENT:  Negative for mouth sores, sore throat and trouble swallowing.    Respiratory:  Negative for cough, chest tightness, shortness of breath and wheezing.    Cardiovascular:  Negative for chest pain and leg swelling.   Gastrointestinal:  Negative for abdominal pain, nausea and vomiting.   Endocrine: Negative for cold intolerance and heat intolerance.   Musculoskeletal:  Negative for arthralgias and gait problem.   Allergic/Immunologic: Positive for environmental allergies. Negative for immunocompromised state.   Neurological:  Negative for light-headedness and headaches.  "  Hematological:  Negative for adenopathy.   Psychiatric/Behavioral:  Negative for sleep disturbance. The patient is not nervous/anxious.        Historical Information   Past Medical History:   Diagnosis Date    Allergic rhinitis     Anxiety     Asthma     GERD (gastroesophageal reflux disease)      Past Surgical History:   Procedure Laterality Date    CIRCUMCISION      VASECTOMY       Family History   Problem Relation Age of Onset    Hypertension Mother     Hyperlipidemia Mother     Diabetes Father     Asthma Sister          Meds/Allergies     Current Outpatient Medications:     albuterol (PROVENTIL HFA,VENTOLIN HFA) 90 mcg/act inhaler, Inhale 2 puffs every 6 (six) hours as needed for wheezing, Disp: , Rfl:     budesonide-formoterol (SYMBICORT) 160-4.5 mcg/act inhaler, Inhale 2 puffs 2 (two) times a day Rinse mouth after use, Disp: 30.6 g, Rfl: 1    buPROPion (WELLBUTRIN XL) 150 mg 24 hr tablet, Take 1 tablet (150 mg total) by mouth every morning, Disp: 30 tablet, Rfl: 5  Allergies   Allergen Reactions    Cats Claw (Uncaria Tomentosa) Cough       Vitals: Blood pressure 112/74, pulse 84, temperature 97.6 °F (36.4 °C), temperature source Tympanic, height 5' 7\" (1.702 m), weight 83 kg (183 lb), SpO2 96%. Body mass index is 28.66 kg/m². Oxygen Therapy  SpO2: 96 %  Oxygen Therapy: None (Room air)      Physical Exam  Physical Exam  Vitals reviewed.   Constitutional:       General: He is not in acute distress.     Appearance: Normal appearance. He is well-developed and normal weight. He is not ill-appearing, toxic-appearing or diaphoretic.   HENT:      Head: Normocephalic and atraumatic.      Right Ear: External ear normal.      Left Ear: External ear normal.      Nose: Nose normal.      Mouth/Throat:      Mouth: Mucous membranes are moist.      Pharynx: Oropharynx is clear. No oropharyngeal exudate.      Comments: No thrush  Eyes:      General: No scleral icterus.        Right eye: No discharge.         Left eye: No " "discharge.      Conjunctiva/sclera: Conjunctivae normal.      Pupils: Pupils are equal, round, and reactive to light.   Neck:      Vascular: No JVD.      Trachea: No tracheal deviation.   Cardiovascular:      Rate and Rhythm: Normal rate and regular rhythm.      Heart sounds: Normal heart sounds. No murmur heard.  Pulmonary:      Effort: Pulmonary effort is normal. No respiratory distress.      Breath sounds: Normal breath sounds. No stridor. No wheezing, rhonchi or rales.   Abdominal:      General: Bowel sounds are normal. There is no distension.      Palpations: Abdomen is soft.      Tenderness: There is no abdominal tenderness. There is no guarding.   Musculoskeletal:         General: No swelling or deformity.      Cervical back: Neck supple.      Right lower leg: No edema.      Left lower leg: No edema.   Lymphadenopathy:      Cervical: No cervical adenopathy.   Skin:     General: Skin is warm and dry.      Coloration: Skin is not jaundiced.      Findings: No rash.   Neurological:      Mental Status: He is alert and oriented to person, place, and time.   Psychiatric:         Mood and Affect: Mood normal.         Behavior: Behavior normal.         Thought Content: Thought content normal.         Labs: I have personally reviewed pertinent lab results.  Lab Results   Component Value Date    WBC 21.56 (H) 03/07/2024    HGB 14.4 03/07/2024    HCT 43.9 03/07/2024    MCV 78 (L) 03/07/2024     03/07/2024     Lab Results   Component Value Date    CALCIUM 9.6 03/05/2024    K 3.8 03/05/2024    CO2 28 03/05/2024     03/05/2024    BUN 6 03/05/2024    CREATININE 0.85 03/05/2024     No results found for: \"IGE\"  No results found for: \"ALT\", \"AST\", \"GGT\", \"ALKPHOS\", \"BILITOT\"    Abs Eos 3/5/2024- 550     Imaging and other studies: new images and reports personally reviewed  CXR 3/5/2024 - no dense infiltrates, no PTX, no effusions, mild right basilar ground glass infiltrate     Pulmonary function testing: none " available     German Nieto DO, FACP  [facility]

## 2024-11-20 ENCOUNTER — HOSPITAL ENCOUNTER (EMERGENCY)
Facility: HOSPITAL | Age: 29
Discharge: HOME | End: 2024-11-20
Attending: EMERGENCY MEDICINE
Payer: COMMERCIAL

## 2024-11-20 ENCOUNTER — APPOINTMENT (EMERGENCY)
Dept: RADIOLOGY | Facility: HOSPITAL | Age: 29
End: 2024-11-20
Payer: COMMERCIAL

## 2024-11-20 VITALS
SYSTOLIC BLOOD PRESSURE: 130 MMHG | DIASTOLIC BLOOD PRESSURE: 68 MMHG | RESPIRATION RATE: 18 BRPM | HEART RATE: 80 BPM | TEMPERATURE: 98.2 F | OXYGEN SATURATION: 98 %

## 2024-11-20 DIAGNOSIS — S43.101A AC SEPARATION, RIGHT, INITIAL ENCOUNTER: Primary | ICD-10-CM
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
## 2024-11-20 PROCEDURE — 73060 X-RAY EXAM OF HUMERUS: CPT

## 2024-11-20 PROCEDURE — 90715 TDAP VACCINE 7 YRS/> IM: CPT

## 2024-11-20 PROCEDURE — 90471 IMMUNIZATION ADMIN: CPT

## 2024-11-20 PROCEDURE — 99284 EMERGENCY DEPT VISIT MOD MDM: CPT | Performed by: EMERGENCY MEDICINE

## 2024-11-20 PROCEDURE — 99284 EMERGENCY DEPT VISIT MOD MDM: CPT

## 2024-11-20 PROCEDURE — 73030 X-RAY EXAM OF SHOULDER: CPT

## 2024-11-20 RX ADMIN — TETANUS TOXOID, REDUCED DIPHTHERIA TOXOID AND ACELLULAR PERTUSSIS VACCINE, ADSORBED 0.5 ML: 5; 2.5; 8; 8; 2.5 SUSPENSION INTRAMUSCULAR at 18:33

## 2024-11-20 NOTE — DISCHARGE INSTRUCTIONS
You may take 650mg tylenol, or 400mg ibuprofen every 6 hours as needed for pain.     Wear sling when up and active, but may remove for sleep and bathing.     Follow up with orthopedics for continued pain.
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
## 2024-11-20 NOTE — Clinical Note
Johnathan Uribe was seen and treated in our emergency department on 11/20/2024.                Diagnosis:     Johnathan  is off the rest of the shift today, may return to work on return date.    He may return on this date: 11/23/2024         If you have any questions or concerns, please don't hesitate to call.      Hope Hooker MD    ______________________________           _______________          _______________  Hospital Representative                              Date                                Time
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
## 2024-12-09 ENCOUNTER — TELEPHONE (OUTPATIENT)
Dept: PLASTIC SURGERY | Facility: CLINIC | Age: 29
End: 2024-12-09

## 2024-12-09 NOTE — TELEPHONE ENCOUNTER
LVM for pt regarding appt on 02/14 with Dr Gore. Informed pt of change in Dr BASURTO's schedule and needing to r/s this appt. Informed pt of new appt on 03/12 at 11:10 am with Dr BASURTO in [place]. Advised pt to callback to r/s or cancel this appt if needed.

## 2025-01-28 DIAGNOSIS — M76.52 PATELLAR TENDONITIS OF BOTH KNEES: Primary | ICD-10-CM

## 2025-01-28 DIAGNOSIS — M76.51 PATELLAR TENDONITIS OF BOTH KNEES: Primary | ICD-10-CM

## 2025-01-28 RX ORDER — PREDNISONE 10 MG/1
TABLET ORAL
Qty: 30 TABLET | Refills: 0 | Status: SHIPPED | OUTPATIENT
Start: 2025-01-28 | End: 2025-02-07

## 2025-02-26 ENCOUNTER — TELEPHONE (OUTPATIENT)
Age: 30
End: 2025-02-26

## 2025-02-26 NOTE — TELEPHONE ENCOUNTER
Spoke to patient due to the providers schedule changed we need to move your 3/12/25 appt. Patient did RS to [facility] but on 3/5/25 at 820 at the [place] location.

## 2025-03-05 ENCOUNTER — OFFICE VISIT (OUTPATIENT)
Age: 30
End: 2025-03-05
Payer: COMMERCIAL

## 2025-03-05 VITALS — WEIGHT: 183 LBS | BODY MASS INDEX: 28.66 KG/M2

## 2025-03-05 DIAGNOSIS — L70.0 ACNE VULGARIS: Primary | ICD-10-CM

## 2025-03-05 DIAGNOSIS — L21.9 SEBORRHEIC DERMATITIS: ICD-10-CM

## 2025-03-05 PROCEDURE — 99204 OFFICE O/P NEW MOD 45 MIN: CPT

## 2025-03-05 RX ORDER — KETOCONAZOLE 20 MG/ML
SHAMPOO, SUSPENSION TOPICAL
Qty: 120 ML | Refills: 9 | Status: SHIPPED | OUTPATIENT
Start: 2025-03-05

## 2025-03-05 RX ORDER — CLINDAMYCIN PHOSPHATE 10 UG/ML
LOTION TOPICAL
Qty: 60 ML | Refills: 2 | Status: SHIPPED | OUTPATIENT
Start: 2025-03-05

## 2025-03-05 RX ORDER — TRETINOIN 0.25 MG/G
CREAM TOPICAL
Qty: 45 G | Refills: 2 | Status: SHIPPED | OUTPATIENT
Start: 2025-03-05

## 2025-03-05 RX ORDER — CLOBETASOL PROPIONATE 0.5 MG/ML
SOLUTION TOPICAL
Qty: 50 ML | Refills: 0 | Status: SHIPPED | OUTPATIENT
Start: 2025-03-05

## 2025-03-05 NOTE — PATIENT INSTRUCTIONS
"ACNE VULGARIS    Plan today:     AM:  - Start benzoyl peroxide cleanser (over the counter products like Panoxyl, CeraVe and Neutrogena contain this product listed under \"active ingredients\"). Make sure this 4% or less.   - Start clindamycin 1% lotion -After cleansing face in the morning, apply a small amount to entire face. Apply daily. Avoid eyes and corners of the mouth.   - SPF 30 or greater (can be a lotion with SPF like CeraVe AM)/non-comedogenic moisturizer such as CeraVe, Cetaphil or Vanicream.     PM:  - Gentle cleanser, such as CeraVe, Cetaphil or La Roche Posay  - Start Tretinoin 0.025% cream-Apply a pea-sized amount evenly to the entire face one hour before bedtime. Start by applying every other night, advance to nightly as tolerated. Avoid eyes and mouth.   - non-comedogenic moisturizer such as CeraVe, Cetaphil or Vanicream. Can be used on top of retinoid or in combination for dryness/irritation.    SEBORRHEIC DERMATITIS  Assessment and Plan:  Based on a thorough discussion of this condition and the management approach to it (including a comprehensive discussion of the known risks, side effects and potential benefits of treatment), the patient (family) agrees to implement the following specific plan:  Discontinue folliculitis shampoo.   Start-Ketoconazole 2% shampoo-Lather into scalp and allow to sit for 5-10 min then rinse. Please apply daily for 1 week, then three times a week (Monday, Wednesday, Friday) for maintenance.   Start Clobetasol 0.05% scalp solution- apply to affected areas of scalp when flaring twice a day for 1 week only, then as needed. Avoid face.       "

## 2025-03-05 NOTE — PROGRESS NOTES
"[facility] Dermatology Clinic Note     Patient Name: Johnathan Uribe  Encounter Date: 3/5/25     Have you been cared for by a [facility] Dermatologist in the last 3 years and, if so, which description applies to you?    NO.   I am considered a \"new\" patient and must complete all patient intake questions. I am MALE/not capable of bearing children.    REVIEW OF SYSTEMS:  Have you recently had or currently have any of the following? Recent fever or chills? No  Any non-healing wound? No   PAST MEDICAL HISTORY:  Have you personally ever had or currently have any of the following?  If \"YES,\" then please provide more detail. Skin cancer (such as Melanoma, Basal Cell Carcinoma, Squamous Cell Carcinoma?  No  Tuberculosis, HIV/AIDS, Hepatitis B or C: No  Radiation Treatment No   HISTORY OF IMMUNOSUPPRESSION:   Do you have a history of any of the following:  Systemic Immunosuppression such as Diabetes, Biologic or Immunotherapy, Chemotherapy, Organ Transplantation, Bone Marrow Transplantation or Prednisone?  No     Answering \"YES\" requires the addition of the dotphrase \"IMMUNOSUPPRESSED\" as the first diagnosis of the patient's visit.   FAMILY HISTORY:  Any \"first degree relatives\" (parent, brother, sister, or child) with the following?    Skin Cancer, Pancreatic or Other Cancer? No   PATIENT EXPERIENCE:    Do you want the Dermatologist to perform a COMPLETE skin exam today including a clinical examination under the \"bra and underwear\" areas?  NO  If necessary, do we have your permission to call and leave a detailed message on your Preferred Phone number that includes your specific medical information?  Yes      Allergies   Allergen Reactions    Cats Claw (Uncaria Tomentosa) Cough      Current Outpatient Medications:     albuterol (PROVENTIL HFA,VENTOLIN HFA) 90 mcg/act inhaler, Inhale 2 puffs every 6 (six) hours as needed for wheezing, Disp: , Rfl:     budesonide-formoterol (SYMBICORT) 160-4.5 mcg/act inhaler, Inhale 2 " "puffs 2 (two) times a day Rinse mouth after use, Disp: 30.6 g, Rfl: 1    buPROPion (WELLBUTRIN XL) 150 mg 24 hr tablet, Take 1 tablet (150 mg total) by mouth every morning, Disp: 30 tablet, Rfl: 5          Whom besides the patient is providing clinical information about today's encounter?   NO ADDITIONAL HISTORIAN (patient alone provided history)    Physical Exam and Assessment/Plan by Diagnosis:    ACNE VULGARIS    Physical Exam:  Anatomic Location Affected:  face  Morphological Description: mild post-inflammatory hyperpigmentation seen on exam today      Additional History of Present Condition:  Patient states he has had acne since he hit puberty. He has not been using anything for this. He notes he will get black heads and papules/pustules he is able to express.     Discussed that treatment is directed at improving skin appearance and reducing the likelihood of scarring. Discussed theraputic ladder including topical OTC treatments, topical prescriptions, and oral medications. Discussed side effects as noted below.     Plan today:     AM:  - Start benzoyl peroxide cleanser (over the counter products like Panoxyl, CeraVe and Neutrogena contain this product listed under \"active ingredients\"). Make sure this 4% or less.   - Start clindamycin 1% lotion -After cleansing face in the morning, apply a small amount to entire face. Apply daily. Avoid eyes and corners of the mouth.   - SPF 30 or greater (can be a lotion with SPF like CeraVe AM)/non-comedogenic moisturizer such as CeraVe, Cetaphil or Vanicream.     PM:  - Gentle cleanser, such as CeraVe, Cetaphil or La Roche Posay  - Start Tretinoin 0.025% cream-Apply a pea-sized amount evenly to the entire face one hour before bedtime. Start by applying every other night, advance to nightly as tolerated. Avoid eyes and mouth.   - non-comedogenic moisturizer such as CeraVe, Cetaphil or Vanicream. Can be used on top of retinoid or in combination for " dryness/irritation.      SEBORRHEIC DERMATITIS    Physical Exam:  Anatomic Location Affected:  scalp and beard   Morphological Description:  minimal flaking seen today, patient notes he will get significant flaking and red patches when flaring    Additional History of Present Condition:  Patient is here for dandruff on scalp and bread. He states that he is using a folliculitis shampoo from [facility] (with salicylic acid and tea tree oil). He states this helps but if he doesn't  use it every two days the dandruff comes back.     Assessment and Plan:  Based on a thorough discussion of this condition and the management approach to it (including a comprehensive discussion of the known risks, side effects and potential benefits of treatment), the patient (family) agrees to implement the following specific plan:  Discontinue folliculitis shampoo.   Start-Ketoconazole 2% shampoo-Lather into scalp and allow to sit for 5-10 min then rinse. Please apply daily for 1 week, then three times a week (Monday, Wednesday, Friday) for maintenance.   Start Clobetasol 0.05% scalp solution- apply to affected areas of scalp when flaring twice a day for 1 week only, then as needed. Avoid face.     Follow-up: 6 months.      Scribe Attestation      I,:  Lakisha Christensen MA am acting as a scribe while in the presence of the attending physician.:       I,:  Apple Quiros PA-C personally performed the services described in this documentation    as scribed in my presence.:

## 2025-04-09 ENCOUNTER — OFFICE VISIT (OUTPATIENT)
Dept: FAMILY MEDICINE CLINIC | Facility: CLINIC | Age: 30
End: 2025-04-09
Payer: COMMERCIAL

## 2025-04-09 VITALS
TEMPERATURE: 98 F | HEART RATE: 88 BPM | OXYGEN SATURATION: 100 % | SYSTOLIC BLOOD PRESSURE: 126 MMHG | DIASTOLIC BLOOD PRESSURE: 70 MMHG | BODY MASS INDEX: 29.82 KG/M2 | RESPIRATION RATE: 16 BRPM | WEIGHT: 190 LBS | HEIGHT: 67 IN

## 2025-04-09 DIAGNOSIS — Z11.59 NEED FOR HEPATITIS C SCREENING TEST: ICD-10-CM

## 2025-04-09 DIAGNOSIS — Z00.01 ENCOUNTER FOR GENERAL ADULT MEDICAL EXAMINATION WITH ABNORMAL FINDINGS: Primary | ICD-10-CM

## 2025-04-09 DIAGNOSIS — Z11.4 SCREENING FOR HUMAN IMMUNODEFICIENCY VIRUS: ICD-10-CM

## 2025-04-09 PROCEDURE — 99395 PREV VISIT EST AGE 18-39: CPT | Performed by: FAMILY MEDICINE
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
## 2025-05-28 ENCOUNTER — TELEPHONE (OUTPATIENT)
Age: 30
End: 2025-05-28

## 2025-05-28 NOTE — TELEPHONE ENCOUNTER
LVM for pt regarding appt scheduled for 08/06 with Apple in our [place] office. Informed pt of Apple's schedule change and that we will need to r/s this appt for a different day. Informed that we have availability the week before and the week after. Advised pt that we will be cancelling this appt for now and to callback as soon as possible so we can reschedule.

## 2025-06-11 DIAGNOSIS — J45.51 SEVERE PERSISTENT ASTHMA WITH EXACERBATION: ICD-10-CM

## 2025-06-11 RX ORDER — BUDESONIDE AND FORMOTEROL FUMARATE DIHYDRATE 160; 4.5 UG/1; UG/1
AEROSOL RESPIRATORY (INHALATION)
Qty: 30.6 G | Refills: 0 | Status: SHIPPED | OUTPATIENT
Start: 2025-06-11

## 2025-08-18 ENCOUNTER — OFFICE VISIT (OUTPATIENT)
Dept: PULMONOLOGY | Facility: CLINIC | Age: 30
End: 2025-08-18
Payer: COMMERCIAL

## 2025-08-18 VITALS
DIASTOLIC BLOOD PRESSURE: 70 MMHG | BODY MASS INDEX: 28.19 KG/M2 | SYSTOLIC BLOOD PRESSURE: 140 MMHG | HEART RATE: 76 BPM | TEMPERATURE: 97.3 F | OXYGEN SATURATION: 100 % | WEIGHT: 180 LBS

## 2025-08-18 DIAGNOSIS — K21.9 GASTROESOPHAGEAL REFLUX DISEASE WITHOUT ESOPHAGITIS: ICD-10-CM

## 2025-08-18 DIAGNOSIS — J45.40 MODERATE PERSISTENT ASTHMA WITHOUT COMPLICATION: Primary | ICD-10-CM

## 2025-08-18 DIAGNOSIS — J30.1 SEASONAL ALLERGIC RHINITIS DUE TO POLLEN: ICD-10-CM

## 2025-08-18 PROCEDURE — 95012 NITRIC OXIDE EXP GAS DETER: CPT | Performed by: INTERNAL MEDICINE

## 2025-08-18 PROCEDURE — 94010 BREATHING CAPACITY TEST: CPT | Performed by: INTERNAL MEDICINE

## 2025-08-18 PROCEDURE — 99214 OFFICE O/P EST MOD 30 MIN: CPT | Performed by: INTERNAL MEDICINE
